# Patient Record
Sex: MALE | Race: WHITE | NOT HISPANIC OR LATINO | URBAN - METROPOLITAN AREA
[De-identification: names, ages, dates, MRNs, and addresses within clinical notes are randomized per-mention and may not be internally consistent; named-entity substitution may affect disease eponyms.]

---

## 2017-09-19 ENCOUNTER — OUTPATIENT (OUTPATIENT)
Dept: OUTPATIENT SERVICES | Facility: HOSPITAL | Age: 82
LOS: 1 days | End: 2017-09-19
Payer: MEDICARE

## 2017-09-19 PROCEDURE — 74176 CT ABD & PELVIS W/O CONTRAST: CPT

## 2017-09-19 PROCEDURE — 74176 CT ABD & PELVIS W/O CONTRAST: CPT | Mod: 26

## 2017-10-10 ENCOUNTER — APPOINTMENT (OUTPATIENT)
Dept: SURGERY | Facility: CLINIC | Age: 82
End: 2017-10-10
Payer: MEDICARE

## 2017-10-10 VITALS
HEART RATE: 83 BPM | OXYGEN SATURATION: 97 % | WEIGHT: 195 LBS | SYSTOLIC BLOOD PRESSURE: 152 MMHG | BODY MASS INDEX: 25.03 KG/M2 | HEIGHT: 74 IN | RESPIRATION RATE: 16 BRPM | DIASTOLIC BLOOD PRESSURE: 84 MMHG | TEMPERATURE: 97.9 F

## 2017-10-10 DIAGNOSIS — Z78.9 OTHER SPECIFIED HEALTH STATUS: ICD-10-CM

## 2017-10-10 DIAGNOSIS — Z82.49 FAMILY HISTORY OF ISCHEMIC HEART DISEASE AND OTHER DISEASES OF THE CIRCULATORY SYSTEM: ICD-10-CM

## 2017-10-10 DIAGNOSIS — M10.9 GOUT, UNSPECIFIED: ICD-10-CM

## 2017-10-10 DIAGNOSIS — I48.91 UNSPECIFIED ATRIAL FIBRILLATION: ICD-10-CM

## 2017-10-10 DIAGNOSIS — I10 ESSENTIAL (PRIMARY) HYPERTENSION: ICD-10-CM

## 2017-10-10 PROCEDURE — 99204 OFFICE O/P NEW MOD 45 MIN: CPT

## 2017-10-10 RX ORDER — METOPROLOL TARTRATE 25 MG/1
25 TABLET, FILM COATED ORAL
Refills: 0 | Status: ACTIVE | COMMUNITY

## 2017-10-10 RX ORDER — ALLOPURINOL 200 MG/1
TABLET ORAL
Refills: 0 | Status: ACTIVE | COMMUNITY

## 2017-10-10 RX ORDER — DABIGATRAN ETEXILATE MESYLATE 75 MG/1
75 CAPSULE ORAL
Refills: 0 | Status: ACTIVE | COMMUNITY

## 2017-10-10 RX ORDER — DILTIAZEM HYDROCHLORIDE 120 MG/1
120 TABLET ORAL
Refills: 0 | Status: ACTIVE | COMMUNITY

## 2017-11-15 ENCOUNTER — OTHER (OUTPATIENT)
Age: 82
End: 2017-11-15

## 2017-12-14 ENCOUNTER — APPOINTMENT (OUTPATIENT)
Dept: SURGERY | Facility: AMBULATORY MEDICAL SERVICES | Age: 82
End: 2017-12-14
Payer: MEDICARE

## 2017-12-14 PROCEDURE — 44970 LAPAROSCOPY APPENDECTOMY: CPT

## 2017-12-14 PROCEDURE — 49585: CPT

## 2017-12-26 ENCOUNTER — APPOINTMENT (OUTPATIENT)
Dept: SURGERY | Facility: CLINIC | Age: 82
End: 2017-12-26
Payer: MEDICARE

## 2017-12-26 VITALS
TEMPERATURE: 98 F | DIASTOLIC BLOOD PRESSURE: 80 MMHG | HEART RATE: 84 BPM | RESPIRATION RATE: 16 BRPM | OXYGEN SATURATION: 97 % | SYSTOLIC BLOOD PRESSURE: 124 MMHG

## 2017-12-26 DIAGNOSIS — Z09 ENCOUNTER FOR FOLLOW-UP EXAMINATION AFTER COMPLETED TREATMENT FOR CONDITIONS OTHER THAN MALIGNANT NEOPLASM: ICD-10-CM

## 2017-12-26 DIAGNOSIS — K37 UNSPECIFIED APPENDICITIS: ICD-10-CM

## 2017-12-26 DIAGNOSIS — K42.9 UMBILICAL HERNIA W/OUT OBSTRUCTION OR GANGRENE: ICD-10-CM

## 2017-12-26 PROCEDURE — 99024 POSTOP FOLLOW-UP VISIT: CPT

## 2018-09-12 ENCOUNTER — APPOINTMENT (OUTPATIENT)
Dept: ORTHOPEDIC SURGERY | Facility: CLINIC | Age: 83
End: 2018-09-12
Payer: MEDICARE

## 2018-09-12 VITALS
HEIGHT: 74 IN | SYSTOLIC BLOOD PRESSURE: 138 MMHG | BODY MASS INDEX: 25.28 KG/M2 | DIASTOLIC BLOOD PRESSURE: 90 MMHG | WEIGHT: 197 LBS | HEART RATE: 61 BPM

## 2018-09-12 DIAGNOSIS — M16.11 UNILATERAL PRIMARY OSTEOARTHRITIS, RIGHT HIP: ICD-10-CM

## 2018-09-12 PROCEDURE — 73502 X-RAY EXAM HIP UNI 2-3 VIEWS: CPT | Mod: RT

## 2018-09-12 PROCEDURE — 99204 OFFICE O/P NEW MOD 45 MIN: CPT

## 2018-10-10 ENCOUNTER — APPOINTMENT (OUTPATIENT)
Dept: ORTHOPEDIC SURGERY | Facility: CLINIC | Age: 83
End: 2018-10-10
Payer: MEDICARE

## 2018-10-10 DIAGNOSIS — M16.11 UNILATERAL PRIMARY OSTEOARTHRITIS, RIGHT HIP: ICD-10-CM

## 2018-10-10 PROCEDURE — 99212 OFFICE O/P EST SF 10 MIN: CPT

## 2018-10-16 ENCOUNTER — OUTPATIENT (OUTPATIENT)
Dept: OUTPATIENT SERVICES | Facility: HOSPITAL | Age: 83
LOS: 1 days | End: 2018-10-16
Payer: MEDICARE

## 2018-10-16 VITALS
HEIGHT: 73 IN | OXYGEN SATURATION: 97 % | RESPIRATION RATE: 16 BRPM | WEIGHT: 195.11 LBS | HEART RATE: 77 BPM | SYSTOLIC BLOOD PRESSURE: 132 MMHG | DIASTOLIC BLOOD PRESSURE: 62 MMHG | TEMPERATURE: 98 F

## 2018-10-16 DIAGNOSIS — M16.11 UNILATERAL PRIMARY OSTEOARTHRITIS, RIGHT HIP: ICD-10-CM

## 2018-10-16 DIAGNOSIS — Z90.49 ACQUIRED ABSENCE OF OTHER SPECIFIED PARTS OF DIGESTIVE TRACT: Chronic | ICD-10-CM

## 2018-10-16 DIAGNOSIS — Z01.818 ENCOUNTER FOR OTHER PREPROCEDURAL EXAMINATION: ICD-10-CM

## 2018-10-16 LAB
ANION GAP SERPL CALC-SCNC: 7 MMOL/L — SIGNIFICANT CHANGE UP (ref 5–17)
APTT BLD: 54.5 SEC — HIGH (ref 27.5–37.4)
BLD GP AB SCN SERPL QL: SIGNIFICANT CHANGE UP
BUN SERPL-MCNC: 20 MG/DL — SIGNIFICANT CHANGE UP (ref 7–23)
CALCIUM SERPL-MCNC: 9.1 MG/DL — SIGNIFICANT CHANGE UP (ref 8.4–10.5)
CHLORIDE SERPL-SCNC: 102 MMOL/L — SIGNIFICANT CHANGE UP (ref 96–108)
CO2 SERPL-SCNC: 28 MMOL/L — SIGNIFICANT CHANGE UP (ref 22–31)
CREAT SERPL-MCNC: 1.18 MG/DL — SIGNIFICANT CHANGE UP (ref 0.5–1.3)
GLUCOSE SERPL-MCNC: 139 MG/DL — HIGH (ref 70–99)
HCT VFR BLD CALC: 36.5 % — LOW (ref 39–50)
HGB BLD-MCNC: 12.2 G/DL — LOW (ref 13–17)
INR BLD: 1.4 RATIO — HIGH (ref 0.88–1.16)
MCHC RBC-ENTMCNC: 32.7 PG — SIGNIFICANT CHANGE UP (ref 27–34)
MCHC RBC-ENTMCNC: 33.4 GM/DL — SIGNIFICANT CHANGE UP (ref 32–36)
MCV RBC AUTO: 97.9 FL — SIGNIFICANT CHANGE UP (ref 80–100)
NRBC # BLD: 0 /100 WBCS — SIGNIFICANT CHANGE UP (ref 0–0)
PLATELET # BLD AUTO: 226 K/UL — SIGNIFICANT CHANGE UP (ref 150–400)
POTASSIUM SERPL-MCNC: 4.4 MMOL/L — SIGNIFICANT CHANGE UP (ref 3.5–5.3)
POTASSIUM SERPL-SCNC: 4.4 MMOL/L — SIGNIFICANT CHANGE UP (ref 3.5–5.3)
PROTHROM AB SERPL-ACNC: 15.4 SEC — HIGH (ref 9.8–12.7)
RBC # BLD: 3.73 M/UL — LOW (ref 4.2–5.8)
RBC # FLD: 12.2 % — SIGNIFICANT CHANGE UP (ref 10.3–14.5)
SODIUM SERPL-SCNC: 137 MMOL/L — SIGNIFICANT CHANGE UP (ref 135–145)
WBC # BLD: 7.75 K/UL — SIGNIFICANT CHANGE UP (ref 3.8–10.5)
WBC # FLD AUTO: 7.75 K/UL — SIGNIFICANT CHANGE UP (ref 3.8–10.5)

## 2018-10-16 PROCEDURE — 93010 ELECTROCARDIOGRAM REPORT: CPT

## 2018-10-16 RX ORDER — GABAPENTIN 400 MG/1
0 CAPSULE ORAL
Qty: 0 | Refills: 0 | COMMUNITY

## 2018-10-16 RX ORDER — ALLOPURINOL 300 MG
1 TABLET ORAL
Qty: 0 | Refills: 0 | COMMUNITY

## 2018-10-16 RX ORDER — DILTIAZEM HCL 120 MG
1 CAPSULE, EXT RELEASE 24 HR ORAL
Qty: 0 | Refills: 0 | COMMUNITY

## 2018-10-16 RX ORDER — METOPROLOL TARTRATE 50 MG
1 TABLET ORAL
Qty: 0 | Refills: 0 | COMMUNITY

## 2018-10-16 NOTE — H&P PST ADULT - NEGATIVE ENMT SYMPTOMS
no nose bleeds/no recurrent cold sores/no ear pain/no vertigo/no sinus symptoms/no nasal congestion/no throat pain/no dysphagia/no hearing difficulty/no nasal discharge/no tinnitus/no nasal obstruction/no post-nasal discharge/no abnormal taste sensation/no gum bleeding/no dry mouth

## 2018-10-16 NOTE — H&P PST ADULT - MUSCULOSKELETAL
details… detailed exam no joint swelling/no joint erythema/no calf tenderness/decreased ROM due to pain/no joint warmth

## 2018-10-16 NOTE — H&P PST ADULT - PROBLEM SELECTOR PLAN 1
"RIGHT anterior total HIP replacement" on 10/25/18   Diagnostics ordered  Questions answered  Instructions reviewed and signed  Contact info given  Best wishes offered

## 2018-10-16 NOTE — H&P PST ADULT - VENOUS THROMBOEMBOLISM CURRENT STATUS
(1) other risk factor (includes escalating BMI, pack-years of smoking, diabetes requiring insulin, chemotherapy, female gender and length of surgery)

## 2018-10-16 NOTE — H&P PST ADULT - NSANTHOSAYNRD_GEN_A_CORE
No. AVINASH screening performed.  STOP BANG Legend: 0-2 = LOW Risk; 3-4 = INTERMEDIATE Risk; 5-8 = HIGH Risk

## 2018-10-16 NOTE — H&P PST ADULT - HISTORY OF PRESENT ILLNESS
86 to male presents to [facility] for scheduled RIGHT anterior total hip replacement on 10/25/18 with Billy Hooker MD.  Reports right hip pain since 8/2018 with radiatio to groin for which he has tried physical therapy without significant relief.  Pain worst with walking and bending which rates 8/10.  Using cane for stability.

## 2018-10-16 NOTE — H&P PST ADULT - PMH
Atrial fibrillation    Chronic back pain    Gout    Hypertension    Neuropathy  bilateral feet  Osteoarthritis of right hip

## 2018-10-17 ENCOUNTER — OUTPATIENT (OUTPATIENT)
Dept: OUTPATIENT SERVICES | Facility: HOSPITAL | Age: 83
LOS: 1 days | Discharge: HOME | End: 2018-10-17

## 2018-10-17 ENCOUNTER — OUTPATIENT (OUTPATIENT)
Dept: OUTPATIENT SERVICES | Facility: HOSPITAL | Age: 83
LOS: 1 days | Discharge: HOME | End: 2018-10-17
Payer: MEDICARE

## 2018-10-17 DIAGNOSIS — Z90.49 ACQUIRED ABSENCE OF OTHER SPECIFIED PARTS OF DIGESTIVE TRACT: Chronic | ICD-10-CM

## 2018-10-17 LAB
MRSA PCR RESULT.: SIGNIFICANT CHANGE UP
S AUREUS DNA NOSE QL NAA+PROBE: SIGNIFICANT CHANGE UP

## 2018-10-18 ENCOUNTER — APPOINTMENT (OUTPATIENT)
Dept: RADIATION ONCOLOGY | Facility: CLINIC | Age: 83
End: 2018-10-18
Payer: MEDICARE

## 2018-10-18 VITALS
WEIGHT: 195 LBS | SYSTOLIC BLOOD PRESSURE: 120 MMHG | RESPIRATION RATE: 16 BRPM | DIASTOLIC BLOOD PRESSURE: 70 MMHG | HEIGHT: 73.5 IN | BODY MASS INDEX: 25.29 KG/M2 | HEART RATE: 62 BPM

## 2018-10-18 PROCEDURE — 99203 OFFICE O/P NEW LOW 30 MIN: CPT | Mod: 25

## 2018-10-18 PROCEDURE — 77261 THER RADIOLOGY TX PLNG SMPL: CPT

## 2018-10-18 RX ORDER — TRANEXAMIC ACID 100 MG/ML
1 INJECTION, SOLUTION INTRAVENOUS ONCE
Qty: 0 | Refills: 0 | Status: COMPLETED | OUTPATIENT
Start: 2018-10-25 | End: 2018-10-25

## 2018-10-18 RX ORDER — SODIUM CHLORIDE 9 MG/ML
1000 INJECTION, SOLUTION INTRAVENOUS
Qty: 0 | Refills: 0 | Status: DISCONTINUED | OUTPATIENT
Start: 2018-10-25 | End: 2018-10-25

## 2018-10-18 RX ORDER — ACETAMINOPHEN 500 MG
1000 TABLET ORAL ONCE
Qty: 0 | Refills: 0 | Status: COMPLETED | OUTPATIENT
Start: 2018-10-25 | End: 2018-10-25

## 2018-10-18 RX ORDER — CHLORHEXIDINE GLUCONATE 213 G/1000ML
1 SOLUTION TOPICAL ONCE
Qty: 0 | Refills: 0 | Status: COMPLETED | OUTPATIENT
Start: 2018-10-25 | End: 2018-10-25

## 2018-10-18 RX ORDER — APREPITANT 80 MG/1
40 CAPSULE ORAL ONCE
Qty: 0 | Refills: 0 | Status: COMPLETED | OUTPATIENT
Start: 2018-10-25 | End: 2018-10-25

## 2018-10-18 RX ORDER — CEFAZOLIN SODIUM 1 G
2000 VIAL (EA) INJECTION ONCE
Qty: 0 | Refills: 0 | Status: COMPLETED | OUTPATIENT
Start: 2018-10-25 | End: 2018-10-25

## 2018-10-18 RX ORDER — OMEGA-3S/DHA/EPA/FISH OIL 300-1000MG
CAPSULE ORAL
Refills: 0 | Status: DISCONTINUED | COMMUNITY
End: 2018-10-18

## 2018-10-18 NOTE — HISTORY OF PRESENT ILLNESS
[FreeTextEntry1] : Pt has been having Right hip pain for the past 2-3 months, 9/10.  Pt does not comp[jane of pain when sitting 0/10, only when moving around.  Pt ambulates with a cane.  Pt does not take any pain medication only an occasional Tylenol for slight relief.  Pt is scheduled for a Right total hip replacement on 10/25/18 and will be having 1 preop RT treatment to Right hip on 10/24/18.  Radiation procedure was explained to pt with full understanding.

## 2018-10-18 NOTE — VITALS
[Maximal Pain Intensity: 9/10] : 9/10 [Least Pain Intensity: 0/10] : 0/10 [Pain Description/Quality: ___] : Pain description/quality: [unfilled] [Pain Duration: ___] : Pain duration: [unfilled] [Pain Location: ___] : Pain Location: [unfilled] [Pain Interferes with ADLs] : Pain interferes with activities of daily living. [OTC] : OTC [80: Normal activity with effort; some signs or symptoms of disease.] : 80: Normal activity with effort; some signs or symptoms of disease.  [ECOG Performance Status: 1 - Restricted in physically strenuous activity but ambulatory and able to carry out work of a light or sedentary nature] : Performance Status: 1 - Restricted in physically strenuous activity but ambulatory and able to carry out work of a light or sedentary nature, e.g., light house work, office work

## 2018-10-18 NOTE — HISTORY OF PRESENT ILLNESS
[FreeTextEntry1] : Mr Bobo is an 85 y/o male with a one month history of right hip pain, evaluated by Dr Hooker on 9/12/18. X-ray showed narrowing of the joint space. Mr Guadaulpe underwent physical therapy with no improvement, planned for right total hip replacement. In retrospect, patient has had several months' of discomfort, but postponed evaluation , as his wife recently had a pacemaker.

## 2018-10-18 NOTE — PHYSICAL EXAM
[Normal] : normal spine exam without palpable tenderness, no kyphosis or scoliosis [de-identified] : moderate limping/hesitancy when walking, no leg weakness, adequate ROM, does not use cane

## 2018-10-18 NOTE — REASON FOR VISIT
[Consideration of Therapy for Benign Disease] : consideration of therapy for benign disease [Other: ___] : [unfilled] [Spouse] : spouse

## 2018-10-19 PROBLEM — G62.9 POLYNEUROPATHY, UNSPECIFIED: Chronic | Status: ACTIVE | Noted: 2018-10-16

## 2018-10-19 PROBLEM — M16.11 UNILATERAL PRIMARY OSTEOARTHRITIS, RIGHT HIP: Chronic | Status: ACTIVE | Noted: 2018-10-16

## 2018-10-19 PROBLEM — I48.91 UNSPECIFIED ATRIAL FIBRILLATION: Chronic | Status: ACTIVE | Noted: 2018-10-16

## 2018-10-19 PROBLEM — I10 ESSENTIAL (PRIMARY) HYPERTENSION: Chronic | Status: ACTIVE | Noted: 2018-10-16

## 2018-10-19 PROBLEM — M54.9 DORSALGIA, UNSPECIFIED: Chronic | Status: ACTIVE | Noted: 2018-10-16

## 2018-10-19 PROBLEM — M10.9 GOUT, UNSPECIFIED: Chronic | Status: ACTIVE | Noted: 2018-10-16

## 2018-10-24 PROCEDURE — 77431 RADIATION THERAPY MANAGEMENT: CPT

## 2018-10-24 PROCEDURE — 77280 THER RAD SIMULAJ FIELD SMPL: CPT | Mod: 26

## 2018-10-24 PROCEDURE — 77300 RADIATION THERAPY DOSE PLAN: CPT | Mod: 26

## 2018-10-24 RX ORDER — SENNA PLUS 8.6 MG/1
2 TABLET ORAL AT BEDTIME
Qty: 0 | Refills: 0 | Status: DISCONTINUED | OUTPATIENT
Start: 2018-10-25 | End: 2018-10-28

## 2018-10-24 RX ORDER — DOCUSATE SODIUM 100 MG
100 CAPSULE ORAL THREE TIMES A DAY
Qty: 0 | Refills: 0 | Status: DISCONTINUED | OUTPATIENT
Start: 2018-10-25 | End: 2018-10-28

## 2018-10-24 RX ORDER — POLYETHYLENE GLYCOL 3350 17 G/17G
17 POWDER, FOR SOLUTION ORAL DAILY
Qty: 0 | Refills: 0 | Status: DISCONTINUED | OUTPATIENT
Start: 2018-10-25 | End: 2018-10-28

## 2018-10-24 RX ORDER — PANTOPRAZOLE SODIUM 20 MG/1
40 TABLET, DELAYED RELEASE ORAL DAILY
Qty: 0 | Refills: 0 | Status: DISCONTINUED | OUTPATIENT
Start: 2018-10-25 | End: 2018-10-28

## 2018-10-24 RX ORDER — SODIUM CHLORIDE 9 MG/ML
1000 INJECTION, SOLUTION INTRAVENOUS
Qty: 0 | Refills: 0 | Status: DISCONTINUED | OUTPATIENT
Start: 2018-10-25 | End: 2018-10-28

## 2018-10-24 RX ORDER — MAGNESIUM HYDROXIDE 400 MG/1
30 TABLET, CHEWABLE ORAL DAILY
Qty: 0 | Refills: 0 | Status: DISCONTINUED | OUTPATIENT
Start: 2018-10-25 | End: 2018-10-28

## 2018-10-24 RX ORDER — ONDANSETRON 8 MG/1
4 TABLET, FILM COATED ORAL EVERY 6 HOURS
Qty: 0 | Refills: 0 | Status: DISCONTINUED | OUTPATIENT
Start: 2018-10-25 | End: 2018-10-28

## 2018-10-25 ENCOUNTER — TRANSCRIPTION ENCOUNTER (OUTPATIENT)
Age: 83
End: 2018-10-25

## 2018-10-25 ENCOUNTER — APPOINTMENT (OUTPATIENT)
Dept: ORTHOPEDIC SURGERY | Facility: HOSPITAL | Age: 83
End: 2018-10-25

## 2018-10-25 ENCOUNTER — INPATIENT (INPATIENT)
Facility: HOSPITAL | Age: 83
LOS: 2 days | Discharge: HOME | DRG: 470 | End: 2018-10-28
Attending: ORTHOPAEDIC SURGERY | Admitting: ORTHOPAEDIC SURGERY
Payer: MEDICARE

## 2018-10-25 ENCOUNTER — RESULT REVIEW (OUTPATIENT)
Age: 83
End: 2018-10-25

## 2018-10-25 VITALS
HEART RATE: 97 BPM | HEIGHT: 74 IN | RESPIRATION RATE: 9 BRPM | DIASTOLIC BLOOD PRESSURE: 70 MMHG | WEIGHT: 194.01 LBS | SYSTOLIC BLOOD PRESSURE: 135 MMHG | TEMPERATURE: 98 F

## 2018-10-25 DIAGNOSIS — M16.11 UNILATERAL PRIMARY OSTEOARTHRITIS, RIGHT HIP: ICD-10-CM

## 2018-10-25 DIAGNOSIS — Z90.49 ACQUIRED ABSENCE OF OTHER SPECIFIED PARTS OF DIGESTIVE TRACT: Chronic | ICD-10-CM

## 2018-10-25 LAB
ABO RH CONFIRMATION: SIGNIFICANT CHANGE UP
ANION GAP SERPL CALC-SCNC: 5 MMOL/L — SIGNIFICANT CHANGE UP (ref 5–17)
APTT BLD: 30.1 SEC — SIGNIFICANT CHANGE UP (ref 27.5–37.4)
BUN SERPL-MCNC: 19 MG/DL — SIGNIFICANT CHANGE UP (ref 7–23)
CALCIUM SERPL-MCNC: 8.7 MG/DL — SIGNIFICANT CHANGE UP (ref 8.4–10.5)
CHLORIDE SERPL-SCNC: 103 MMOL/L — SIGNIFICANT CHANGE UP (ref 96–108)
CO2 SERPL-SCNC: 28 MMOL/L — SIGNIFICANT CHANGE UP (ref 22–31)
CREAT SERPL-MCNC: 1.17 MG/DL — SIGNIFICANT CHANGE UP (ref 0.5–1.3)
GLUCOSE SERPL-MCNC: 134 MG/DL — HIGH (ref 70–99)
HCT VFR BLD CALC: 28.7 % — LOW (ref 39–50)
HGB BLD-MCNC: 9.5 G/DL — LOW (ref 13–17)
INR BLD: 1.26 RATIO — HIGH (ref 0.88–1.16)
INR BLD: 1.26 RATIO — HIGH (ref 0.88–1.16)
MCHC RBC-ENTMCNC: 32.4 PG — SIGNIFICANT CHANGE UP (ref 27–34)
MCHC RBC-ENTMCNC: 33.1 GM/DL — SIGNIFICANT CHANGE UP (ref 32–36)
MCV RBC AUTO: 98 FL — SIGNIFICANT CHANGE UP (ref 80–100)
NRBC # BLD: 0 /100 WBCS — SIGNIFICANT CHANGE UP (ref 0–0)
PLATELET # BLD AUTO: 175 K/UL — SIGNIFICANT CHANGE UP (ref 150–400)
POTASSIUM SERPL-MCNC: 4.3 MMOL/L — SIGNIFICANT CHANGE UP (ref 3.5–5.3)
POTASSIUM SERPL-SCNC: 4.3 MMOL/L — SIGNIFICANT CHANGE UP (ref 3.5–5.3)
PROTHROM AB SERPL-ACNC: 13.8 SEC — HIGH (ref 9.8–12.7)
PROTHROM AB SERPL-ACNC: 13.8 SEC — HIGH (ref 9.8–12.7)
RBC # BLD: 2.93 M/UL — LOW (ref 4.2–5.8)
RBC # FLD: 12.2 % — SIGNIFICANT CHANGE UP (ref 10.3–14.5)
SODIUM SERPL-SCNC: 136 MMOL/L — SIGNIFICANT CHANGE UP (ref 135–145)
WBC # BLD: 9.92 K/UL — SIGNIFICANT CHANGE UP (ref 3.8–10.5)
WBC # FLD AUTO: 9.92 K/UL — SIGNIFICANT CHANGE UP (ref 3.8–10.5)

## 2018-10-25 PROCEDURE — G0463: CPT

## 2018-10-25 PROCEDURE — 85027 COMPLETE CBC AUTOMATED: CPT

## 2018-10-25 PROCEDURE — 87640 STAPH A DNA AMP PROBE: CPT

## 2018-10-25 PROCEDURE — 86900 BLOOD TYPING SEROLOGIC ABO: CPT

## 2018-10-25 PROCEDURE — 93005 ELECTROCARDIOGRAM TRACING: CPT

## 2018-10-25 PROCEDURE — 85730 THROMBOPLASTIN TIME PARTIAL: CPT

## 2018-10-25 PROCEDURE — 87641 MR-STAPH DNA AMP PROBE: CPT

## 2018-10-25 PROCEDURE — 86901 BLOOD TYPING SEROLOGIC RH(D): CPT

## 2018-10-25 PROCEDURE — 27130 TOTAL HIP ARTHROPLASTY: CPT | Mod: RT

## 2018-10-25 PROCEDURE — 36415 COLL VENOUS BLD VENIPUNCTURE: CPT

## 2018-10-25 PROCEDURE — 27130 TOTAL HIP ARTHROPLASTY: CPT | Mod: AS,RT

## 2018-10-25 PROCEDURE — 88305 TISSUE EXAM BY PATHOLOGIST: CPT | Mod: 26

## 2018-10-25 PROCEDURE — 88311 DECALCIFY TISSUE: CPT | Mod: 26

## 2018-10-25 PROCEDURE — 85610 PROTHROMBIN TIME: CPT

## 2018-10-25 PROCEDURE — 86850 RBC ANTIBODY SCREEN: CPT

## 2018-10-25 PROCEDURE — 80048 BASIC METABOLIC PNL TOTAL CA: CPT

## 2018-10-25 PROCEDURE — 99223 1ST HOSP IP/OBS HIGH 75: CPT

## 2018-10-25 RX ORDER — CEFAZOLIN SODIUM 1 G
2000 VIAL (EA) INJECTION EVERY 8 HOURS
Qty: 0 | Refills: 0 | Status: COMPLETED | OUTPATIENT
Start: 2018-10-25 | End: 2018-10-25

## 2018-10-25 RX ORDER — SENNA PLUS 8.6 MG/1
2 TABLET ORAL
Qty: 0 | Refills: 0 | COMMUNITY
Start: 2018-10-25

## 2018-10-25 RX ORDER — METOPROLOL TARTRATE 50 MG
25 TABLET ORAL DAILY
Qty: 0 | Refills: 0 | Status: DISCONTINUED | OUTPATIENT
Start: 2018-10-25 | End: 2018-10-26

## 2018-10-25 RX ORDER — ACETAMINOPHEN 500 MG
2 TABLET ORAL
Qty: 0 | Refills: 0 | COMMUNITY
Start: 2018-10-25

## 2018-10-25 RX ORDER — DOCUSATE SODIUM 100 MG
1 CAPSULE ORAL
Qty: 0 | Refills: 0 | COMMUNITY
Start: 2018-10-25

## 2018-10-25 RX ORDER — CELECOXIB 200 MG/1
200 CAPSULE ORAL EVERY 12 HOURS
Qty: 0 | Refills: 0 | Status: DISCONTINUED | OUTPATIENT
Start: 2018-10-25 | End: 2018-10-25

## 2018-10-25 RX ORDER — POLYETHYLENE GLYCOL 3350 17 G/17G
17 POWDER, FOR SOLUTION ORAL
Qty: 0 | Refills: 0 | COMMUNITY
Start: 2018-10-25

## 2018-10-25 RX ORDER — HYDROMORPHONE HYDROCHLORIDE 2 MG/ML
0.5 INJECTION INTRAMUSCULAR; INTRAVENOUS; SUBCUTANEOUS
Qty: 0 | Refills: 0 | Status: DISCONTINUED | OUTPATIENT
Start: 2018-10-25 | End: 2018-10-25

## 2018-10-25 RX ORDER — ALLOPURINOL 300 MG
300 TABLET ORAL DAILY
Qty: 0 | Refills: 0 | Status: DISCONTINUED | OUTPATIENT
Start: 2018-10-25 | End: 2018-10-28

## 2018-10-25 RX ORDER — ACETAMINOPHEN 500 MG
1000 TABLET ORAL EVERY 8 HOURS
Qty: 0 | Refills: 0 | Status: DISCONTINUED | OUTPATIENT
Start: 2018-10-26 | End: 2018-10-28

## 2018-10-25 RX ORDER — DILTIAZEM HCL 120 MG
240 CAPSULE, EXT RELEASE 24 HR ORAL DAILY
Qty: 0 | Refills: 0 | Status: DISCONTINUED | OUTPATIENT
Start: 2018-10-26 | End: 2018-10-28

## 2018-10-25 RX ORDER — ACETAMINOPHEN 500 MG
1000 TABLET ORAL EVERY 6 HOURS
Qty: 0 | Refills: 0 | Status: COMPLETED | OUTPATIENT
Start: 2018-10-25 | End: 2018-10-26

## 2018-10-25 RX ORDER — APIXABAN 2.5 MG/1
2.5 TABLET, FILM COATED ORAL EVERY 12 HOURS
Qty: 0 | Refills: 0 | Status: COMPLETED | OUTPATIENT
Start: 2018-10-26 | End: 2018-10-26

## 2018-10-25 RX ORDER — SODIUM CHLORIDE 9 MG/ML
1000 INJECTION, SOLUTION INTRAVENOUS
Qty: 0 | Refills: 0 | Status: DISCONTINUED | OUTPATIENT
Start: 2018-10-25 | End: 2018-10-25

## 2018-10-25 RX ORDER — OXYCODONE HYDROCHLORIDE 5 MG/1
10 TABLET ORAL
Qty: 0 | Refills: 0 | Status: DISCONTINUED | OUTPATIENT
Start: 2018-10-25 | End: 2018-10-28

## 2018-10-25 RX ORDER — GABAPENTIN 400 MG/1
100 CAPSULE ORAL
Qty: 0 | Refills: 0 | Status: DISCONTINUED | OUTPATIENT
Start: 2018-10-25 | End: 2018-10-28

## 2018-10-25 RX ORDER — HYDROMORPHONE HYDROCHLORIDE 2 MG/ML
0.5 INJECTION INTRAMUSCULAR; INTRAVENOUS; SUBCUTANEOUS EVERY 4 HOURS
Qty: 0 | Refills: 0 | Status: DISCONTINUED | OUTPATIENT
Start: 2018-10-25 | End: 2018-10-28

## 2018-10-25 RX ORDER — DABIGATRAN ETEXILATE MESYLATE 150 MG/1
1 CAPSULE ORAL
Qty: 0 | Refills: 0 | COMMUNITY

## 2018-10-25 RX ORDER — OXYCODONE HYDROCHLORIDE 5 MG/1
5 TABLET ORAL
Qty: 0 | Refills: 0 | Status: DISCONTINUED | OUTPATIENT
Start: 2018-10-25 | End: 2018-10-28

## 2018-10-25 RX ORDER — APIXABAN 2.5 MG/1
5 TABLET, FILM COATED ORAL EVERY 12 HOURS
Qty: 0 | Refills: 0 | Status: DISCONTINUED | OUTPATIENT
Start: 2018-10-27 | End: 2018-10-28

## 2018-10-25 RX ADMIN — SENNA PLUS 2 TABLET(S): 8.6 TABLET ORAL at 21:08

## 2018-10-25 RX ADMIN — Medication 400 MILLIGRAM(S): at 21:08

## 2018-10-25 RX ADMIN — Medication 400 MILLIGRAM(S): at 13:56

## 2018-10-25 RX ADMIN — SODIUM CHLORIDE 125 MILLILITER(S): 9 INJECTION, SOLUTION INTRAVENOUS at 16:18

## 2018-10-25 RX ADMIN — HYDROMORPHONE HYDROCHLORIDE 0.5 MILLIGRAM(S): 2 INJECTION INTRAMUSCULAR; INTRAVENOUS; SUBCUTANEOUS at 13:13

## 2018-10-25 RX ADMIN — HYDROMORPHONE HYDROCHLORIDE 0.5 MILLIGRAM(S): 2 INJECTION INTRAMUSCULAR; INTRAVENOUS; SUBCUTANEOUS at 12:10

## 2018-10-25 RX ADMIN — HYDROMORPHONE HYDROCHLORIDE 0.5 MILLIGRAM(S): 2 INJECTION INTRAMUSCULAR; INTRAVENOUS; SUBCUTANEOUS at 12:25

## 2018-10-25 RX ADMIN — Medication 100 MILLIGRAM(S): at 23:29

## 2018-10-25 RX ADMIN — Medication 1000 MILLIGRAM(S): at 21:08

## 2018-10-25 RX ADMIN — Medication 1000 MILLIGRAM(S): at 14:30

## 2018-10-25 RX ADMIN — Medication 100 MILLIGRAM(S): at 21:08

## 2018-10-25 RX ADMIN — APREPITANT 40 MILLIGRAM(S): 80 CAPSULE ORAL at 07:15

## 2018-10-25 RX ADMIN — Medication 100 MILLIGRAM(S): at 16:18

## 2018-10-25 RX ADMIN — OXYCODONE HYDROCHLORIDE 10 MILLIGRAM(S): 5 TABLET ORAL at 21:08

## 2018-10-25 RX ADMIN — OXYCODONE HYDROCHLORIDE 10 MILLIGRAM(S): 5 TABLET ORAL at 21:38

## 2018-10-25 RX ADMIN — SODIUM CHLORIDE 75 MILLILITER(S): 9 INJECTION, SOLUTION INTRAVENOUS at 07:00

## 2018-10-25 RX ADMIN — CHLORHEXIDINE GLUCONATE 1 APPLICATION(S): 213 SOLUTION TOPICAL at 07:00

## 2018-10-25 RX ADMIN — SODIUM CHLORIDE 50 MILLILITER(S): 9 INJECTION, SOLUTION INTRAVENOUS at 12:25

## 2018-10-25 RX ADMIN — GABAPENTIN 100 MILLIGRAM(S): 400 CAPSULE ORAL at 17:56

## 2018-10-25 RX ADMIN — HYDROMORPHONE HYDROCHLORIDE 0.5 MILLIGRAM(S): 2 INJECTION INTRAMUSCULAR; INTRAVENOUS; SUBCUTANEOUS at 13:00

## 2018-10-25 NOTE — CONSULT NOTE ADULT - SUBJECTIVE AND OBJECTIVE BOX
Information Obtained from:  EHR, Physical Chart, Patient at bedside (relevant EHR and Chart information verified with patient)    CC : Patient is a 86y old  Male who presents with a chief complaint of right hip pain    HPI:  85yo M admitted s/p RIGHT THR today.  Has had right hip pain for the last 3 months, dull, throbbing, radiation to groin, tried PT with partial relief, 0-2/10 at rest, up to 8/10 with activity, pain affecting quality of life.       REVIEW OF SYSTEMS:  CONSTITUTIONAL: No fever, weight loss, or fatigue  EYES: No eye pain, visual disturbances, or discharge  ENMT:  No difficulty hearing, tinnitus, vertigo; No sinus or throat pain  NECK: No pain or stiffness  BREASTS: No pain, masses, or nipple discharge  RESPIRATORY: No cough, wheezing, chills or hemoptysis; No shortness of breath  CARDIOVASCULAR: No chest pain, palpitations, dizziness, or leg swelling  GASTROINTESTINAL: No abdominal or epigastric pain. No nausea, vomiting, or hematemesis; No diarrhea or constipation. No melena or hematochezia.  GENITOURINARY: No dysuria, frequency, hematuria, or incontinence  NEUROLOGICAL: No headaches, memory loss, loss of strength, numbness, or tremors  SKIN: No itching, burning, rashes, or lesions   LYMPH NODES: No enlarged glands  ENDOCRINE: No heat or cold intolerance; No hair loss  MUSCULOSKELETAL: No muscle or back pain  PSYCHIATRIC: No depression, anxiety, mood swings, or difficulty sleeping  HEME/LYMPH: No easy bruising, or bleeding gums  ALLERGY AND IMMUNOLOGIC: No hives or eczema    PAST MEDICAL & SURGICAL HISTORY:  Chronic back pain  Neuropathy: bilateral feet  Osteoarthritis of right hip  Hypertension  Atrial fibrillation  Gout  History of appendectomy: 2017    SOCIAL HISTORY:  Lives: with spouse  Smoking Hx: former smoker, quit 50ya  ETOH Hx: 1-2 drinks/2-3x week on average  Illicit Drug Use:  None    Allergies    No Known Allergies    Intolerances    Home Medications:  allopurinol 300 mg oral tablet: 1 tab(s) orally once a day (16 Oct 2018 11:10)  dilTIAZem 240 mg/24 hours oral tablet, extended release: 1 tab(s) orally once a day (16 Oct 2018 11:10)  gabapentin 100 mg oral capsule: orally 2 times a day (16 Oct 2018 11:10)  metoprolol succinate 25 mg oral tablet, extended release: 1 tab(s) orally once a day (16 Oct 2018 11:10)  Pradaxa 150 mg oral capsule: 1 cap(s) orally 2 times a day (16 Oct 2018 11:10)    FAMILY HISTORY:  No pertinent family history in first degree relatives    PHYSICAL EXAM:  Vital Signs Last 24 Hrs  T(C): 36.6 (25 Oct 2018 07:12), Max: 36.6 (25 Oct 2018 07:12)  T(F): 97.8 (25 Oct 2018 07:12), Max: 97.8 (25 Oct 2018 07:12)  HR: 97 (25 Oct 2018 07:12) (97 - 97)  BP: 135/70 (25 Oct 2018 07:12) (135/70 - 135/70)  BP(mean): --  RR: 9 (25 Oct 2018 07:12) (9 - 9)  SpO2: --    GENERAL: NAD, well-groomed, well-developed, awake, alert, oriented x 3, fluent and coherent speech  HEAD:  Atraumatic, Normocephalic  EYES: EOMI, PERRLA, conjunctiva and sclera clear  ENMT: No tonsillar erythema, exudates, or enlargement; Moist mucous membranes, Good dentition, No lesions  NECK: Supple, No JVD, No Cervical LAD, No thyromegaly, No thyroid nodules  NERVOUS SYSTEM:  Good concentration; No facial droop  CHEST WALL: No masses  CHEST/LUNG: Clear to auscultation bilaterally; No rales, rhonchi, wheezing, or rubs  HEART: Regular rate and rhythm; No murmurs, rubs, or gallops  ABDOMEN: Soft, Nontender, Nondistended, Bowel sounds present, No palpable masses or organomegaly, No bruits  EXTREMITIES:  2+ Peripheral Pulses, No clubbing, cyanosis, or edema  INCISION:  Dressing clean/dry/intact    LABS:    PT/INR - ( 25 Oct 2018 06:55 )   PT: 13.8 sec;   INR: 1.26 ratio         PTT - ( 25 Oct 2018 06:55 )  PTT:32.8 sec       EKG (was personally reviewed): yes, afib 85bpm Information Obtained from:  EHR, Physical Chart, Patient at bedside (relevant EHR and Chart information verified with patient)    CC : Patient is a 86y old  Male who presents with a chief complaint of right hip pain    HPI:  85yo M admitted s/p RIGHT THR today.  Has had right hip pain for the last 3 months, dull, throbbing, radiation to groin, tried PT with partial relief, 0-2/10 at rest, up to 8/10 with activity, pain affecting quality of life.     Restless in PACU, going to receive dilaudid now for pain.    REVIEW OF SYSTEMS:  CONSTITUTIONAL: No fever, weight loss, or fatigue  EYES: No eye pain, visual disturbances, or discharge  ENMT:  No difficulty hearing, tinnitus, vertigo; No sinus or throat pain  NECK: No pain or stiffness  BREASTS: No pain, masses, or nipple discharge  RESPIRATORY: No cough, wheezing, chills or hemoptysis; No shortness of breath  CARDIOVASCULAR: No chest pain, palpitations, dizziness, or leg swelling  GASTROINTESTINAL: No abdominal or epigastric pain. No nausea, vomiting, or hematemesis; No diarrhea or constipation. No melena or hematochezia.  GENITOURINARY: No dysuria, frequency, hematuria, or incontinence  NEUROLOGICAL: No headaches, memory loss, loss of strength, numbness, or tremors  SKIN: No itching, burning, rashes, or lesions   LYMPH NODES: No enlarged glands  ENDOCRINE: No heat or cold intolerance; No hair loss  MUSCULOSKELETAL: No muscle or back pain  PSYCHIATRIC: No depression, anxiety, mood swings, or difficulty sleeping  HEME/LYMPH: No easy bruising, or bleeding gums  ALLERGY AND IMMUNOLOGIC: No hives or eczema    PAST MEDICAL & SURGICAL HISTORY:  Chronic back pain  Neuropathy: bilateral feet  Osteoarthritis of right hip  Hypertension  Atrial fibrillation  Gout  History of appendectomy: 2017    SOCIAL HISTORY:  Lives: with spouse  Smoking Hx: former smoker, quit 50ya  ETOH Hx: 1-2 drinks/2-3x week on average  Illicit Drug Use:  None    Allergies    No Known Allergies    Intolerances    Home Medications:  allopurinol 300 mg oral tablet: 1 tab(s) orally once a day (16 Oct 2018 11:10)  dilTIAZem 240 mg/24 hours oral tablet, extended release: 1 tab(s) orally once a day (16 Oct 2018 11:10)  gabapentin 100 mg oral capsule: orally 2 times a day (16 Oct 2018 11:10)  metoprolol succinate 25 mg oral tablet, extended release: 1 tab(s) orally once a day (16 Oct 2018 11:10)  Pradaxa 150 mg oral capsule: 1 cap(s) orally 2 times a day (16 Oct 2018 11:10)    FAMILY HISTORY:  No pertinent family history in first degree relatives    PHYSICAL EXAM:  Vital Signs Last 24 Hrs  T(C): 36.6 (25 Oct 2018 07:12), Max: 36.6 (25 Oct 2018 07:12)  T(F): 97.8 (25 Oct 2018 07:12), Max: 97.8 (25 Oct 2018 07:12)  HR: 97 (25 Oct 2018 07:12) (97 - 97)  BP: 135/70 (25 Oct 2018 07:12) (135/70 - 135/70)  BP(mean): --  RR: 9 (25 Oct 2018 07:12) (9 - 9)  SpO2: --    GENERAL: NAD, well-groomed, well-developed, awake, alert, restless, appears to be in pain at surgical site  HEAD:  Atraumatic, Normocephalic  EYES: EOMI, PERRLA, conjunctiva and sclera clear  ENMT: No tonsillar erythema, exudates, or enlargement; Moist mucous membranes, Good dentition, No lesions  NECK: Supple, No JVD, No Cervical LAD, No thyromegaly, No thyroid nodules  NERVOUS SYSTEM:   No facial droop, moving all 4 extremities  CHEST WALL: No masses  CHEST/LUNG: Clear to auscultation bilaterally; No rales, rhonchi, wheezing, or rubs  HEART: Regular rate and rhythm; No murmurs, rubs, or gallops  ABDOMEN: Soft, Nontender, Nondistended, Bowel sounds present, No palpable masses or organomegaly, No bruits  EXTREMITIES:  2+ Peripheral Pulses, No clubbing, cyanosis, or edema  INCISION:  Dressing clean/dry/intact    LABS:    PT/INR - ( 25 Oct 2018 06:55 )   PT: 13.8 sec;   INR: 1.26 ratio         PTT - ( 25 Oct 2018 06:55 )  PTT:32.8 sec       EKG (was personally reviewed): yes, afib 85bpm

## 2018-10-25 NOTE — OCCUPATIONAL THERAPY INITIAL EVALUATION ADULT - PRECAUTIONS/LIMITATIONS, REHAB EVAL
surgical precautions/No hyperextension, no extreme external rotation, no extreme abduction of operated LE./fall precautions
surgical precautions/No hyperextension, no extreme external rotation, no extreme abduction of operated LE./fall precautions

## 2018-10-25 NOTE — OCCUPATIONAL THERAPY INITIAL EVALUATION ADULT - BED MOBILITY TRAINING, PT EVAL
Pt will perform bed mobility with supervision within 2-3 sessions.
Pt will perform bed mobility with supervision within 3-5 sessions.

## 2018-10-25 NOTE — DISCHARGE NOTE ADULT - MEDICATION SUMMARY - MEDICATIONS TO TAKE
I will START or STAY ON the medications listed below when I get home from the hospital:    Rolling Walker with 5inch wheels  -- Indication: For Medical Device    3:1 Commode  -- s/p Right anterior THR  -- Indication: For Medical Device    acetaminophen 500 mg oral tablet  -- 2 tab(s) by mouth every 12 hours for 2 more weeks.  -- Indication: For Pain    oxyCODONE 5 mg oral tablet  -- 1 tab(s) by mouth every 3 hours, As needed, Mild Pain (1 - 3) MDD:8  -- Indication: For Pain    dilTIAZem 240 mg/24 hours oral tablet, extended release  -- 1 tab(s) by mouth once a day  -- Indication: For Home Med    apixaban 5 mg oral tablet  -- 1 tab(s) by mouth every 12 hours  -- Indication: For Blood clot Prevention    gabapentin 100 mg oral capsule  -- orally 2 times a day  -- Indication: For Home Med    allopurinol 300 mg oral tablet  -- 1 tab(s) by mouth once a day  -- Indication: For Home Med    metoprolol succinate 25 mg oral tablet, extended release  -- 1 tab(s) by mouth once a day  -- Indication: For Home Med    senna oral tablet  -- 2 tab(s) by mouth once a day (at bedtime)  -- Indication: For Constipation    docusate sodium 100 mg oral capsule  -- 1 cap(s) by mouth 3 times a day  -- Indication: For Constipation    polyethylene glycol 3350 oral powder for reconstitution  -- 17 gram(s) by mouth once a day, As needed, Constipation  -- Indication: For Constipation    pantoprazole 40 mg oral delayed release tablet  -- 1 tab(s) by mouth once a day  -- Indication: For Stomach Protection

## 2018-10-25 NOTE — OCCUPATIONAL THERAPY INITIAL EVALUATION ADULT - TRANSFER TRAINING, PT EVAL
Patient will transfer to toilet with DME as needed with supervision within 3-5 sessions.
none
Patient will transfer to toilet with DME as needed with supervision within 2-3 sessions.

## 2018-10-25 NOTE — PHYSICAL THERAPY INITIAL EVALUATION ADULT - BED MOBILITY TRAINING, PT EVAL
Goals 2-4 days, Pt will perform bed mobility Goals 2-4 days, Pt will perform bed mobility with supervision

## 2018-10-25 NOTE — PHYSICAL THERAPY INITIAL EVALUATION ADULT - GENERAL OBSERVATIONS, REHAB EVAL
pt received semisupine, +hemovac, +IV, +PAS pt received semisupine, +hemovac, +IV, +PAS, +conner catheter

## 2018-10-25 NOTE — PHYSICAL THERAPY INITIAL EVALUATION ADULT - TRANSFER TRAINING, PT EVAL
Goals 2-4 days, Pt will perform sit to stand w/ rolling walker Goals 2-4 days, Pt will perform sit to stand w/ rolling walker w/ supervision

## 2018-10-25 NOTE — DISCHARGE NOTE ADULT - CARE PLAN
Principal Discharge DX:	Primary osteoarthritis of right hip  Goal:	Improve function and quality of life with reduced hip pain  Assessment and plan of treatment:	Your new total hip replacement requires proper care.  Your surgical care provider is your best resource for information.  Your Physical Therapy /Occupational Therapy will include Ambulation, Transfers , Stairs, ADLs (activities of daily living), range of motion, and isometrics.  Your participation is vital for the fullest recovery and best results.  You may bear full weight as tolerated with rolling walker, cane or assistive device.    TOTAL HIP PRECAUTIONS  No straight leg raise  No external rotation of hip when extended-standing or lying flat  No hyperextension of hip when standing (kickback).  Do not take a tub bath yet.   Do not resume driving until you have your surgeon’s permission.     Keep incision clean and dry.  Change the dressing daily if there is drainage noted.  When there is no drainage the wound may be open to air.   The wound is closed with either sutures (stiches) or Prineo (glued on mesh tape.)  Both sutures and Prineo are removed 2 weeks after surgery at rehab facility or in surgeon's office.  If there is no wet drainage you may shower and pat dry with a clean towel.  Assessment and plan of treatment:	For Constipation :   • Increase your daily water intake.   • Try adding fiber to your diet by eating fruits, vegetables and foods that are rich in grains.  • If you do experience constipation, you may take an over-the-counter stool softener/laxative such as Colace, Senokot , Milk of Magnesia or Miralax.  - Call your doctor if you experience:  • An increase in pain not controlled by pain medication or change in activity or  position.  • Temperature greater than 101° F.  • Redness, increased swelling or foul smelling drainage from or around the surgical  incision.  • Numbness, tingling or a change in color or temperature of the operative leg.  • Call your doctor immediately if you experience chest pain, shortness of breath or calf pain.

## 2018-10-25 NOTE — OCCUPATIONAL THERAPY INITIAL EVALUATION ADULT - PLANNED THERAPY INTERVENTIONS, OT EVAL
ADL retraining/bed mobility training/transfer training
bed mobility training/transfer training/ADL retraining

## 2018-10-25 NOTE — PHYSICAL THERAPY INITIAL EVALUATION ADULT - GAIT TRAINING, PT EVAL
Goals 2-4 days, Pt will ambulate Goals 2-4 days, Pt will ambulate 75 ft w/ rolling walker w/ supervision

## 2018-10-25 NOTE — DISCHARGE NOTE ADULT - OTHER SIGNIFICANT FINDINGS
Please follow up with your private medical doctor after 6 weeks of Eliquis for further management of your atrial fibrillation. (resume Pradaxa OR continue Eliquis???)

## 2018-10-25 NOTE — DISCHARGE NOTE ADULT - PLAN OF CARE
Improve function and quality of life with reduced hip pain Your new total hip replacement requires proper care.  Your surgical care provider is your best resource for information.  Your Physical Therapy /Occupational Therapy will include Ambulation, Transfers , Stairs, ADLs (activities of daily living), range of motion, and isometrics.  Your participation is vital for the fullest recovery and best results.  You may bear full weight as tolerated with rolling walker, cane or assistive device.    TOTAL HIP PRECAUTIONS  No straight leg raise  No external rotation of hip when extended-standing or lying flat  No hyperextension of hip when standing (kickback).  Do not take a tub bath yet.   Do not resume driving until you have your surgeon’s permission.     Keep incision clean and dry.  Change the dressing daily if there is drainage noted.  When there is no drainage the wound may be open to air.   The wound is closed with either sutures (stiches) or Prineo (glued on mesh tape.)  Both sutures and Prineo are removed 2 weeks after surgery at rehab facility or in surgeon's office.  If there is no wet drainage you may shower and pat dry with a clean towel. For Constipation :   • Increase your daily water intake.   • Try adding fiber to your diet by eating fruits, vegetables and foods that are rich in grains.  • If you do experience constipation, you may take an over-the-counter stool softener/laxative such as Colace, Senokot , Milk of Magnesia or Miralax.  - Call your doctor if you experience:  • An increase in pain not controlled by pain medication or change in activity or  position.  • Temperature greater than 101° F.  • Redness, increased swelling or foul smelling drainage from or around the surgical  incision.  • Numbness, tingling or a change in color or temperature of the operative leg.  • Call your doctor immediately if you experience chest pain, shortness of breath or calf pain.

## 2018-10-25 NOTE — DISCHARGE NOTE ADULT - PATIENT PORTAL LINK FT
You can access the [facility], offered by [facility], by registering with the following website: [email or web address]

## 2018-10-25 NOTE — BRIEF OPERATIVE NOTE - COMMENTS
implants : acet 58 mm crown cluster cup w/ 1 screw and +5 lateralized xle liner, femur #16 standard offset collarless stem w/ 36+10 co-cr head

## 2018-10-25 NOTE — PRE-OP CHECKLIST - CHLOROHEXIDINE WASH IN ASU
Pt did not attend Recreation group at 2015. Pt did not attend Wrap Up group at 2100.     Electronically signed by Ezequiel Cleaning on 10/18/2018 at 10:33 PM
25-Oct-2018 07:00

## 2018-10-25 NOTE — DISCHARGE NOTE ADULT - NS AS ACTIVITY OBS
Walking-Outdoors allowed/Do not drive or operate machinery/may shower when hip wounds completely dry/Stairs allowed/Showering allowed/Walking-Indoors allowed

## 2018-10-25 NOTE — DISCHARGE NOTE ADULT - CARE PROVIDER_API CALL
UNA Hooker (MD), Orthopaedic Surgery  [address] [place] [address]  Phone: [phone]  Fax: [phone]

## 2018-10-25 NOTE — DISCHARGE NOTE ADULT - NSFTFSERV1RD_GEN_ALL_CORE
rehabilitation services/medication teaching and assessment/teaching and training/wound care and assessment/observation and assessment

## 2018-10-25 NOTE — DISCHARGE NOTE ADULT - HOSPITAL COURSE
This 87 yo male patient was admitted to [facility] on 10/25/18 with a history of severe degenerative joint disease of the right hip and for elective total joint replacement.  Patient had appropriate preop medical evaluation and testing.    Patient received antibiotics according to SCIP guidelines for infection prevention.  The patient underwent an uncomplicated anterior right total hip replacement by Dr. Gabriel Hooker on 10/25.   Eliquis was given for DVT prophylaxis and for management of atrial fibrillation.  Anesthesia, Medical Hospitalist, Physical Therapy and Occupational Therapy were consulted.  Patient is stable for discharge home with home care services and with a good prognosis on ***.  Appropriate discharge instructions and medications are provided in this document.

## 2018-10-25 NOTE — CONSULT NOTE ADULT - ASSESSMENT
POD#0 s/p RIGHT THR  - Pain control  - Bowel regimen  - PT/OT  - DVT PPx    Chronic Afib  -     Gout  -     Neuropathy  -     Chronic Back Pain  - POD#0 s/p RIGHT THR  - Pain control  - Bowel regimen  - PT/OT  - DVT PPx - Eliquis  - EBL of ~ 1 Liter, repeat CBC at 2pm and transfuse if needed    Chronic Afib  - continue cardizem and metoprolol with hold parameters  - Eliquis instead of pradaxa (he has not taken pradaxa for last 3 days)    Gout  - continue allopurinol    Neuropathy  - continue gapapentin

## 2018-10-25 NOTE — PHARMACOTHERAPY INTERVENTION NOTE - COMMENTS
patient takes Pradaxa 150mg BID for AF. Spoke with patient's prescriber and she is OK with switch to Eliquis postoperatively. I will discuss with patient in AM and determine if he will take Eliquis for 2 or 4 weeks after surgery before reinitiating Pradaxa.

## 2018-10-25 NOTE — DISCHARGE NOTE ADULT - COMMUNITY RESOURCES
Nurse to visit day after discharge.  If you have any problems, please contact the agency at the above number.

## 2018-10-25 NOTE — OCCUPATIONAL THERAPY INITIAL EVALUATION ADULT - ADL RETRAINING, OT EVAL
Patient will dress lower body with set-up , AE as needed within 2-3 sessions.
Patient will dress lower body with set-up , AE as needed within 3-5 sessions.

## 2018-10-25 NOTE — BRIEF OPERATIVE NOTE - PROCEDURE
<<-----Click on this checkbox to enter Procedure Right total hip arthroplasty  10/25/2018  via anterior approach  Active  LOGATA

## 2018-10-26 LAB
ANION GAP SERPL CALC-SCNC: 2 MMOL/L — LOW (ref 5–17)
BUN SERPL-MCNC: 18 MG/DL — SIGNIFICANT CHANGE UP (ref 7–23)
CALCIUM SERPL-MCNC: 8.5 MG/DL — SIGNIFICANT CHANGE UP (ref 8.4–10.5)
CHLORIDE SERPL-SCNC: 102 MMOL/L — SIGNIFICANT CHANGE UP (ref 96–108)
CO2 SERPL-SCNC: 29 MMOL/L — SIGNIFICANT CHANGE UP (ref 22–31)
CREAT SERPL-MCNC: 1.15 MG/DL — SIGNIFICANT CHANGE UP (ref 0.5–1.3)
GLUCOSE SERPL-MCNC: 108 MG/DL — HIGH (ref 70–99)
HCT VFR BLD CALC: 25.3 % — LOW (ref 39–50)
HGB BLD-MCNC: 8.4 G/DL — LOW (ref 13–17)
MAGNESIUM SERPL-MCNC: 1.8 MG/DL — SIGNIFICANT CHANGE UP (ref 1.6–2.6)
MCHC RBC-ENTMCNC: 31.9 PG — SIGNIFICANT CHANGE UP (ref 27–34)
MCHC RBC-ENTMCNC: 33.2 GM/DL — SIGNIFICANT CHANGE UP (ref 32–36)
MCV RBC AUTO: 96.2 FL — SIGNIFICANT CHANGE UP (ref 80–100)
NRBC # BLD: 0 /100 WBCS — SIGNIFICANT CHANGE UP (ref 0–0)
PLATELET # BLD AUTO: 184 K/UL — SIGNIFICANT CHANGE UP (ref 150–400)
POTASSIUM SERPL-MCNC: 4.6 MMOL/L — SIGNIFICANT CHANGE UP (ref 3.5–5.3)
POTASSIUM SERPL-SCNC: 4.6 MMOL/L — SIGNIFICANT CHANGE UP (ref 3.5–5.3)
RBC # BLD: 2.63 M/UL — LOW (ref 4.2–5.8)
RBC # FLD: 12.1 % — SIGNIFICANT CHANGE UP (ref 10.3–14.5)
SODIUM SERPL-SCNC: 133 MMOL/L — LOW (ref 135–145)
WBC # BLD: 7.01 K/UL — SIGNIFICANT CHANGE UP (ref 3.8–10.5)
WBC # FLD AUTO: 7.01 K/UL — SIGNIFICANT CHANGE UP (ref 3.8–10.5)

## 2018-10-26 PROCEDURE — 99233 SBSQ HOSP IP/OBS HIGH 50: CPT

## 2018-10-26 RX ORDER — PANTOPRAZOLE SODIUM 20 MG/1
1 TABLET, DELAYED RELEASE ORAL
Qty: 30 | Refills: 1 | OUTPATIENT
Start: 2018-10-26 | End: 2018-12-24

## 2018-10-26 RX ORDER — METOPROLOL TARTRATE 50 MG
50 TABLET ORAL DAILY
Qty: 0 | Refills: 0 | Status: DISCONTINUED | OUTPATIENT
Start: 2018-10-27 | End: 2018-10-28

## 2018-10-26 RX ORDER — APIXABAN 2.5 MG/1
1 TABLET, FILM COATED ORAL
Qty: 60 | Refills: 0 | OUTPATIENT
Start: 2018-10-26 | End: 2018-11-24

## 2018-10-26 RX ORDER — METOPROLOL TARTRATE 50 MG
25 TABLET ORAL ONCE
Qty: 0 | Refills: 0 | Status: COMPLETED | OUTPATIENT
Start: 2018-10-26 | End: 2018-10-26

## 2018-10-26 RX ADMIN — Medication 100 MILLIGRAM(S): at 05:47

## 2018-10-26 RX ADMIN — OXYCODONE HYDROCHLORIDE 10 MILLIGRAM(S): 5 TABLET ORAL at 05:47

## 2018-10-26 RX ADMIN — PANTOPRAZOLE SODIUM 40 MILLIGRAM(S): 20 TABLET, DELAYED RELEASE ORAL at 05:48

## 2018-10-26 RX ADMIN — GABAPENTIN 100 MILLIGRAM(S): 400 CAPSULE ORAL at 05:47

## 2018-10-26 RX ADMIN — Medication 1000 MILLIGRAM(S): at 17:09

## 2018-10-26 RX ADMIN — APIXABAN 2.5 MILLIGRAM(S): 2.5 TABLET, FILM COATED ORAL at 08:32

## 2018-10-26 RX ADMIN — Medication 25 MILLIGRAM(S): at 05:47

## 2018-10-26 RX ADMIN — Medication 1000 MILLIGRAM(S): at 08:35

## 2018-10-26 RX ADMIN — OXYCODONE HYDROCHLORIDE 10 MILLIGRAM(S): 5 TABLET ORAL at 11:14

## 2018-10-26 RX ADMIN — Medication 100 MILLIGRAM(S): at 21:53

## 2018-10-26 RX ADMIN — GABAPENTIN 100 MILLIGRAM(S): 400 CAPSULE ORAL at 17:08

## 2018-10-26 RX ADMIN — OXYCODONE HYDROCHLORIDE 5 MILLIGRAM(S): 5 TABLET ORAL at 17:07

## 2018-10-26 RX ADMIN — OXYCODONE HYDROCHLORIDE 10 MILLIGRAM(S): 5 TABLET ORAL at 06:17

## 2018-10-26 RX ADMIN — Medication 300 MILLIGRAM(S): at 12:27

## 2018-10-26 RX ADMIN — OXYCODONE HYDROCHLORIDE 10 MILLIGRAM(S): 5 TABLET ORAL at 10:43

## 2018-10-26 RX ADMIN — Medication 25 MILLIGRAM(S): at 10:40

## 2018-10-26 RX ADMIN — Medication 100 MILLIGRAM(S): at 16:57

## 2018-10-26 RX ADMIN — Medication 400 MILLIGRAM(S): at 02:46

## 2018-10-26 RX ADMIN — Medication 1000 MILLIGRAM(S): at 08:32

## 2018-10-26 RX ADMIN — APIXABAN 2.5 MILLIGRAM(S): 2.5 TABLET, FILM COATED ORAL at 21:53

## 2018-10-26 RX ADMIN — Medication 25 MILLIGRAM(S): at 12:50

## 2018-10-26 RX ADMIN — Medication 1000 MILLIGRAM(S): at 17:08

## 2018-10-26 RX ADMIN — OXYCODONE HYDROCHLORIDE 5 MILLIGRAM(S): 5 TABLET ORAL at 17:37

## 2018-10-26 RX ADMIN — SENNA PLUS 2 TABLET(S): 8.6 TABLET ORAL at 21:53

## 2018-10-26 RX ADMIN — Medication 1000 MILLIGRAM(S): at 02:46

## 2018-10-26 NOTE — CONSULT NOTE ADULT - ASSESSMENT
The patient is an 86 year old male with a history of HTN, gout, chronic atrial fibrillation who is admitted s/p right THR.    Plan:  - Tachycardia likely multifactorial (dehydration, pain, anemia)  - Continue IV fluids today  - Pain control  - Will give extra dose of metoprolol succinate 25 mg today and increase dose to 50 mg daily  - Continue diltiazem  mg daily  - Continue apixaban (eventually 5 mg bid)

## 2018-10-26 NOTE — CONSULT NOTE ADULT - SUBJECTIVE AND OBJECTIVE BOX
History of Present Illness: The patient is an 86 year old male with a history of HTN, gout, chronic atrial fibrillation who is admitted s/p right THR. He feels well and denies chest pain, shortness of breath, palpitations, dizziness. He feels a little dry. He has been noted to be tachycardic to 110-120 overnight and this morning.    Past Medical/Surgical History:  HTN, gout, chronic atrial fibrillation    Medications:  MEDICATIONS  (STANDING):  acetaminophen   Tablet .. 1000 milliGRAM(s) Oral every 8 hours  allopurinol 300 milliGRAM(s) Oral daily  apixaban 2.5 milliGRAM(s) Oral every 12 hours  diltiazem    milliGRAM(s) Oral daily  docusate sodium 100 milliGRAM(s) Oral three times a day  gabapentin 100 milliGRAM(s) Oral two times a day  lactated ringers. 1000 milliLiter(s) (125 mL/Hr) IV Continuous <Continuous>  metoprolol succinate ER 25 milliGRAM(s) Oral once  metoprolol succinate ER 25 milliGRAM(s) Oral daily  pantoprazole    Tablet 40 milliGRAM(s) Oral daily  senna 2 Tablet(s) Oral at bedtime    MEDICATIONS  (PRN):  aluminum hydroxide/magnesium hydroxide/simethicone Suspension 30 milliLiter(s) Oral four times a day PRN Indigestion  HYDROmorphone  Injectable 0.5 milliGRAM(s) IV Push every 4 hours PRN Severe Pain (7 - 10)  magnesium hydroxide Suspension 30 milliLiter(s) Oral daily PRN Constipation  ondansetron Injectable 4 milliGRAM(s) IV Push every 6 hours PRN Nausea and/or Vomiting  oxyCODONE    IR 5 milliGRAM(s) Oral every 3 hours PRN Mild Pain (1 - 3)  oxyCODONE    IR 10 milliGRAM(s) Oral every 3 hours PRN Moderate Pain (4 - 6)  polyethylene glycol 3350 17 Gram(s) Oral daily PRN Constipation      Family History: Non-contributory family history of premature cardiovascular atherosclerotic disease    Social History: No tobacco, alcohol or drug use    Review of Systems:  General: No fevers, chills, weight loss or gain  Skin: No rashes, color changes  Cardiovascular: No chest pain, orthopnea  Respiratory: No shortness of breath, cough  Gastrointestinal: No nausea, abdominal pain  Genitourinary: No incontinence, pain with urination  Musculoskeletal: No pain, swelling, decreased range of motion  Neurological: No headache, weakness  Psychiatric: No depression, anxiety  Endocrine: No weight loss or gain, increased thirst  All other systems are comprehensively negative.    Physical Exam:  Vitals:        Vital Signs Last 24 Hrs  T(C): 36.6 (26 Oct 2018 07:50), Max: 36.7 (25 Oct 2018 11:45)  T(F): 97.8 (26 Oct 2018 07:50), Max: 98.1 (25 Oct 2018 11:45)  HR: 96 (26 Oct 2018 07:50) (77 - 108)  BP: 143/70 (26 Oct 2018 07:50) (91/56 - 143/70)  BP(mean): --  RR: 16 (26 Oct 2018 07:50) (12 - 20)  SpO2: 96% (26 Oct 2018 07:50) (94% - 100%)  General: NAD  HEENT: MMM  Neck: No JVD, no carotid bruit  Lungs: CTAB  CV: RRR, nl S1/S2, no M/R/G  Abdomen: S/NT/ND, +BS  Extremities: No LE edema, no cyanosis  Neuro: AAOx3, non-focal  Skin: No rash    Labs:                        8.4    7.01  )-----------( 184      ( 26 Oct 2018 08:11 )             25.3     10-26    133<L>  |  102  |  18  ----------------------------<  108<H>  4.6   |  29  |  1.15    Ca    8.5      26 Oct 2018 08:11          PT/INR - ( 25 Oct 2018 13:58 )   PT: 13.8 sec;   INR: 1.26 ratio         PTT - ( 25 Oct 2018 13:58 )  PTT:30.1 sec    ECG: Atrial fibrillation, rates 110-120

## 2018-10-27 LAB
ANION GAP SERPL CALC-SCNC: 6 MMOL/L — SIGNIFICANT CHANGE UP (ref 5–17)
BUN SERPL-MCNC: 19 MG/DL — SIGNIFICANT CHANGE UP (ref 7–23)
CALCIUM SERPL-MCNC: 8.7 MG/DL — SIGNIFICANT CHANGE UP (ref 8.4–10.5)
CHLORIDE SERPL-SCNC: 101 MMOL/L — SIGNIFICANT CHANGE UP (ref 96–108)
CO2 SERPL-SCNC: 29 MMOL/L — SIGNIFICANT CHANGE UP (ref 22–31)
CREAT SERPL-MCNC: 1.19 MG/DL — SIGNIFICANT CHANGE UP (ref 0.5–1.3)
GLUCOSE SERPL-MCNC: 116 MG/DL — HIGH (ref 70–99)
HCT VFR BLD CALC: 24.7 % — LOW (ref 39–50)
HGB BLD-MCNC: 8.3 G/DL — LOW (ref 13–17)
MCHC RBC-ENTMCNC: 32.3 PG — SIGNIFICANT CHANGE UP (ref 27–34)
MCHC RBC-ENTMCNC: 33.6 GM/DL — SIGNIFICANT CHANGE UP (ref 32–36)
MCV RBC AUTO: 96.1 FL — SIGNIFICANT CHANGE UP (ref 80–100)
NRBC # BLD: 0 /100 WBCS — SIGNIFICANT CHANGE UP (ref 0–0)
PLATELET # BLD AUTO: 177 K/UL — SIGNIFICANT CHANGE UP (ref 150–400)
POTASSIUM SERPL-MCNC: 4.7 MMOL/L — SIGNIFICANT CHANGE UP (ref 3.5–5.3)
POTASSIUM SERPL-SCNC: 4.7 MMOL/L — SIGNIFICANT CHANGE UP (ref 3.5–5.3)
RBC # BLD: 2.57 M/UL — LOW (ref 4.2–5.8)
RBC # FLD: 12.2 % — SIGNIFICANT CHANGE UP (ref 10.3–14.5)
SODIUM SERPL-SCNC: 136 MMOL/L — SIGNIFICANT CHANGE UP (ref 135–145)
WBC # BLD: 7.95 K/UL — SIGNIFICANT CHANGE UP (ref 3.8–10.5)
WBC # FLD AUTO: 7.95 K/UL — SIGNIFICANT CHANGE UP (ref 3.8–10.5)

## 2018-10-27 PROCEDURE — 99233 SBSQ HOSP IP/OBS HIGH 50: CPT

## 2018-10-27 PROCEDURE — 12345: CPT | Mod: NC

## 2018-10-27 RX ADMIN — APIXABAN 5 MILLIGRAM(S): 2.5 TABLET, FILM COATED ORAL at 21:17

## 2018-10-27 RX ADMIN — OXYCODONE HYDROCHLORIDE 5 MILLIGRAM(S): 5 TABLET ORAL at 05:22

## 2018-10-27 RX ADMIN — Medication 50 MILLIGRAM(S): at 05:22

## 2018-10-27 RX ADMIN — GABAPENTIN 100 MILLIGRAM(S): 400 CAPSULE ORAL at 17:17

## 2018-10-27 RX ADMIN — Medication 1000 MILLIGRAM(S): at 05:23

## 2018-10-27 RX ADMIN — Medication 1000 MILLIGRAM(S): at 05:22

## 2018-10-27 RX ADMIN — Medication 300 MILLIGRAM(S): at 12:32

## 2018-10-27 RX ADMIN — PANTOPRAZOLE SODIUM 40 MILLIGRAM(S): 20 TABLET, DELAYED RELEASE ORAL at 05:22

## 2018-10-27 RX ADMIN — Medication 1000 MILLIGRAM(S): at 12:32

## 2018-10-27 RX ADMIN — Medication 1000 MILLIGRAM(S): at 21:17

## 2018-10-27 RX ADMIN — Medication 100 MILLIGRAM(S): at 13:45

## 2018-10-27 RX ADMIN — Medication 1000 MILLIGRAM(S): at 13:00

## 2018-10-27 RX ADMIN — APIXABAN 5 MILLIGRAM(S): 2.5 TABLET, FILM COATED ORAL at 09:03

## 2018-10-27 RX ADMIN — GABAPENTIN 100 MILLIGRAM(S): 400 CAPSULE ORAL at 05:22

## 2018-10-27 RX ADMIN — Medication 100 MILLIGRAM(S): at 05:22

## 2018-10-27 RX ADMIN — Medication 100 MILLIGRAM(S): at 21:17

## 2018-10-27 RX ADMIN — OXYCODONE HYDROCHLORIDE 5 MILLIGRAM(S): 5 TABLET ORAL at 05:52

## 2018-10-27 RX ADMIN — SENNA PLUS 2 TABLET(S): 8.6 TABLET ORAL at 21:17

## 2018-10-27 RX ADMIN — Medication 240 MILLIGRAM(S): at 05:22

## 2018-10-28 VITALS — HEART RATE: 78 BPM

## 2018-10-28 LAB
ANION GAP SERPL CALC-SCNC: 8 MMOL/L — SIGNIFICANT CHANGE UP (ref 5–17)
BUN SERPL-MCNC: 24 MG/DL — HIGH (ref 7–23)
CALCIUM SERPL-MCNC: 8.5 MG/DL — SIGNIFICANT CHANGE UP (ref 8.4–10.5)
CHLORIDE SERPL-SCNC: 100 MMOL/L — SIGNIFICANT CHANGE UP (ref 96–108)
CO2 SERPL-SCNC: 29 MMOL/L — SIGNIFICANT CHANGE UP (ref 22–31)
CREAT SERPL-MCNC: 1.35 MG/DL — HIGH (ref 0.5–1.3)
GLUCOSE SERPL-MCNC: 114 MG/DL — HIGH (ref 70–99)
HCT VFR BLD CALC: 25 % — LOW (ref 39–50)
HGB BLD-MCNC: 8.3 G/DL — LOW (ref 13–17)
MCHC RBC-ENTMCNC: 31.9 PG — SIGNIFICANT CHANGE UP (ref 27–34)
MCHC RBC-ENTMCNC: 33.2 GM/DL — SIGNIFICANT CHANGE UP (ref 32–36)
MCV RBC AUTO: 96.2 FL — SIGNIFICANT CHANGE UP (ref 80–100)
NRBC # BLD: 0 /100 WBCS — SIGNIFICANT CHANGE UP (ref 0–0)
PLATELET # BLD AUTO: 186 K/UL — SIGNIFICANT CHANGE UP (ref 150–400)
POTASSIUM SERPL-MCNC: 3.9 MMOL/L — SIGNIFICANT CHANGE UP (ref 3.5–5.3)
POTASSIUM SERPL-SCNC: 3.9 MMOL/L — SIGNIFICANT CHANGE UP (ref 3.5–5.3)
RBC # BLD: 2.6 M/UL — LOW (ref 4.2–5.8)
RBC # FLD: 12.1 % — SIGNIFICANT CHANGE UP (ref 10.3–14.5)
SODIUM SERPL-SCNC: 137 MMOL/L — SIGNIFICANT CHANGE UP (ref 135–145)
WBC # BLD: 6.12 K/UL — SIGNIFICANT CHANGE UP (ref 3.8–10.5)
WBC # FLD AUTO: 6.12 K/UL — SIGNIFICANT CHANGE UP (ref 3.8–10.5)

## 2018-10-28 PROCEDURE — 99233 SBSQ HOSP IP/OBS HIGH 50: CPT

## 2018-10-28 RX ORDER — OXYCODONE HYDROCHLORIDE 5 MG/1
1 TABLET ORAL
Qty: 56 | Refills: 0 | OUTPATIENT
Start: 2018-10-28 | End: 2018-11-03

## 2018-10-28 RX ADMIN — Medication 100 MILLIGRAM(S): at 05:31

## 2018-10-28 RX ADMIN — Medication 1000 MILLIGRAM(S): at 05:30

## 2018-10-28 RX ADMIN — Medication 50 MILLIGRAM(S): at 05:31

## 2018-10-28 RX ADMIN — OXYCODONE HYDROCHLORIDE 5 MILLIGRAM(S): 5 TABLET ORAL at 01:46

## 2018-10-28 RX ADMIN — Medication 240 MILLIGRAM(S): at 05:31

## 2018-10-28 RX ADMIN — GABAPENTIN 100 MILLIGRAM(S): 400 CAPSULE ORAL at 05:32

## 2018-10-28 RX ADMIN — OXYCODONE HYDROCHLORIDE 5 MILLIGRAM(S): 5 TABLET ORAL at 02:30

## 2018-10-28 RX ADMIN — PANTOPRAZOLE SODIUM 40 MILLIGRAM(S): 20 TABLET, DELAYED RELEASE ORAL at 05:31

## 2018-10-28 RX ADMIN — Medication 1000 MILLIGRAM(S): at 05:32

## 2018-10-28 RX ADMIN — APIXABAN 5 MILLIGRAM(S): 2.5 TABLET, FILM COATED ORAL at 08:14

## 2018-10-28 NOTE — PROGRESS NOTE ADULT - ATTENDING COMMENTS
Plan of care was discuss with patient, all questions were answered, seems understand and in agreement.  Patient is medically cleared for discharge pending ortho and PT clearance.   Check cbc in 1 week.

## 2018-10-28 NOTE — PROGRESS NOTE ADULT - PROVIDER SPECIALTY LIST ADULT
Cardiology
Cardiology
Hospitalist
Hospitalist
Orthopedics
Pharmacy
Orthopedics
Hospitalist

## 2018-10-28 NOTE — PROGRESS NOTE ADULT - ASSESSMENT
POD#1 s/p RIGHT THR  - Pain controlled  - Bowel regimen  - PT/OT  - DVT PPx - Eliquis  - EBL of ~ 1 Liter, transfuse if symptomatic or Hgb <= 7.5  - Sheehan out this AM, monitor for void  - DC Planning for home    Chronic Afib, Afib with RVR  - continue cardizem and metoprolol with hold parameters (got does this AM)  - HR between 110-120, occasional bursts to 150, asymptomatic  - Cardiology Consult to help with rate control  - Eliquis instead of pradaxa, patient will discuss with his cardiologist about permanently staying on Eliquis    Gout  - continue allopurinol    Neuropathy  - continue gapapentin
POD#2 s/p RIGHT THR  - Pain controlled  - Bowel regimen  - PT/OT  - DVT PPx - Eliquis  - EBL of ~ 1 Liter, transfuse if symptomatic or Hgb <= 7.5  - sp Sheehan removal. now voiding freely.   - DC Planning for home    Chronic Afib, Afib with RVR  - continue cardizem and metoprolol with hold parameters   - HR between 110-120  - Cardiology Consult noted.   - Eliquis instead of pradaxa, patient will discuss with his cardiologist about permanently staying on Eliquis    Gout  - continue allopurinol    Neuropathy  - continue gapapentin
The patient is an 86 year old male with a history of HTN, gout, chronic atrial fibrillation who is admitted s/p right THR.    Plan:  - Tachycardia likely multifactorial (dehydration, pain, anemia)  - Hemoglobin low but stable  - Pain control  - Continue metoprolol succinate 50 mg daily   - Continue diltiazem  mg daily  - Continue apixaban  - Discharge planning
The patient is an 86 year old male with a history of HTN, gout, chronic atrial fibrillation who is admitted s/p right THR.    Plan:  - Tachycardia likely multifactorial (dehydration, pain, anemia)  - Hemoglobin low but stable  - Pain control  - Continue metoprolol succinate 50 mg daily   - Continue diltiazem  mg daily  - Continue apixaban (eventually 5 mg bid)
s/p RIGHT THR  - Pain controlled  - Bowel regimen  - PT/OT  - DVT PPx - Eliquis  - EBL of ~ 1 Liter, transfuse if symptomatic or Hgb <= 7.5  - sp Sheehan removal. now voiding freely.   - DC Planning for home today.    Chronic Afib, Afib with RVR  - continue cardizem and metoprolol with hold parameters   - HR between 110-120  - Cardiology Consult noted.   - Eliquis instead of pradaxa, patient will discuss with his cardiologist about permanently staying on Eliquis    Gout  - continue allopurinol    Neuropathy  - continue gapapentin  Post op anemia due to active blood loss- check cbc prn.  Patient is asymptomatics.  check cbc in 1 week and prn.

## 2018-10-28 NOTE — PROGRESS NOTE ADULT - SUBJECTIVE AND OBJECTIVE BOX
ANSON NOBLES                                                               [id number]                                                      Allergies---No Known Allergies         Pt is a 86y year old Male s/p Right THR.   Pain is 3/10.   Tolerating the diet.   The patient is A&O x 3, resting comfortably in bed, with no complants.   Denies chest pain / shortness of breath / dyspnea / nausea / vomiting / headaches or light headed ness.         Vital Signs Last 24 Hrs  T(F): 97.9 (10-26-18 @ 03:30), Max: 98.1 (10-25-18 @ 23:30)  HR: 102 (10-26-18 @ 05:37)  BP: 116/71 (10-26-18 @ 05:37)  RR: 16 (10-26-18 @ 03:30)  SpO2: 98% (10-26-18 @ 03:30)    I&O's Detail    25 Oct 2018 07:01  -  26 Oct 2018 07:00  --------------------------------------------------------  IN:    IV PiggyBack: 150 mL    lactated ringers.: 500 mL    lactated ringers.: 1500 mL  Total IN: 2150 mL    OUT:    Accordian: 255 mL    Estimated Blood Loss: 700 mL    Indwelling Catheter - Urethral: 1000 mL  Total OUT: 1955 mL    Total NET: 195 mL        PE:   Right Lower Extremity:   Dressing is C/D/I, fixated well on the patient.   NVI.   Sensation intact to light touch distally.   No gross evidence of motor deficits.   EHL/FHL/TA intact.   +2 DP/PT pulses.   Capillary refill < 2 seconds.   Toes are pink and mobile.   Negative calf tenderness.   No evidence of impeding compartment syndrome.   PAS stockings are in place.   HV in place.        Labs Pending        A:   Pt is a 86y year old Male S/P right total hip replacement, Post Op Day #1        Plan:    - Follow up with Medicine    - OOB with PT/OT   - Incentive spirometry   - Anterior Hip precautions   - Pain control    - Labs in A.M.   - Dressing change tomorrow   - D/C planning   - DVT ppx = PAS +  apixaban 2.5 milliGRAM(s) Oral every 12 hours                                                                                                                                                                             Franky RICHARDSON
Chief Complaint: Right THR    Interval Events: Intermittently tachycardic    Review of Systems:  General: No fevers, chills, weight loss or gain  Skin: No rashes, color changes  Cardiovascular: No chest pain, orthopnea  Respiratory: No shortness of breath, cough  Gastrointestinal: No nausea, abdominal pain  Genitourinary: No incontinence, pain with urination  Musculoskeletal: No pain, swelling, decreased range of motion  Neurological: No headache, weakness  Psychiatric: No depression, anxiety  Endocrine: No weight loss or gain, increased thirst  All other systems are comprehensively negative.    Physical Exam:  Vital Signs Last 24 Hrs  T(C): 36.6 (27 Oct 2018 08:10), Max: 37.1 (27 Oct 2018 03:30)  T(F): 97.9 (27 Oct 2018 08:10), Max: 98.8 (27 Oct 2018 03:30)  HR: 96 (27 Oct 2018 08:10) (67 - 97)  BP: 94/58 (27 Oct 2018 08:10) (94/58 - 151/89)  BP(mean): --  RR: 18 (27 Oct 2018 08:10) (16 - 18)  SpO2: 99% (27 Oct 2018 08:10) (93% - 99%)  General: NAD  HEENT: MMM  Neck: No JVD, no carotid bruit  Lungs: CTAB  CV: RRR, nl S1/S2, no M/R/G  Abdomen: S/NT/ND, +BS  Extremities: No LE edema, no cyanosis  Neuro: AAOx3, non-focal  Skin: No rash    Labs:                        8.3    7.95  )-----------( 177      ( 27 Oct 2018 07:00 )             24.7     10-27    136  |  101  |  19  ----------------------------<  116<H>  4.7   |  29  |  1.19    Ca    8.7      27 Oct 2018 07:00  Mg     1.8     10-26          PT/INR - ( 25 Oct 2018 13:58 )   PT: 13.8 sec;   INR: 1.26 ratio         PTT - ( 25 Oct 2018 13:58 )  PTT:30.1 sec    Telemetry: AF, HR 
Chief Complaint: Right THR    Interval Events: No events overnight. No complaints.    Review of Systems:  General: No fevers, chills, weight loss or gain  Skin: No rashes, color changes  Cardiovascular: No chest pain, orthopnea  Respiratory: No shortness of breath, cough  Gastrointestinal: No nausea, abdominal pain  Genitourinary: No incontinence, pain with urination  Musculoskeletal: No pain, swelling, decreased range of motion  Neurological: No headache, weakness  Psychiatric: No depression, anxiety  Endocrine: No weight loss or gain, increased thirst  All other systems are comprehensively negative.    Physical Exam:  Vital Signs Last 24 Hrs  T(C): 37.1 (28 Oct 2018 08:03), Max: 37.3 (27 Oct 2018 23:20)  T(F): 98.7 (28 Oct 2018 08:03), Max: 99.1 (27 Oct 2018 23:20)  HR: 80 (28 Oct 2018 08:03) (79 - 105)  BP: 123/64 (28 Oct 2018 08:03) (93/61 - 156/89)  BP(mean): --  RR: 18 (28 Oct 2018 08:03) (15 - 18)  SpO2: 97% (28 Oct 2018 08:03) (97% - 100%)  General: NAD  HEENT: MMM  Neck: No JVD, no carotid bruit  Lungs: CTAB  CV: RRR, nl S1/S2, no M/R/G  Abdomen: S/NT/ND, +BS  Extremities: No LE edema, no cyanosis  Neuro: AAOx3, non-focal  Skin: No rash    Labs:  10-28    137  |  100  |  24<H>  ----------------------------<  114<H>  3.9   |  29  |  1.35<H>    Ca    8.5      28 Oct 2018 06:27  Mg     1.8     10-26                          8.3    6.12  )-----------( 186      ( 28 Oct 2018 06:27 )             25.0       Telemetry: AF, HR 80-90s, PVCs
Discharge medication calendar:  (Pradaxa 150mg BID preop)  Eliquis 5mg q12h x 30 days then resume Pradaxa (or discuss with cardiologist for future AF medication)  APAP 1000mg q12h x 2-3 weeks  No NSAID (per cardiologist)  Pantoprazole 40mg QAM x 6 weeks  Narcotic PRN  Docusate 100mg TID while taking narcotic  Miralax, Senna, or Bisacodyl PRN for treatment of constipation
INTERVAL HPI/OVERNIGHT EVENTS:   Patient seen and examined.  Eating, conner out this am - no void yet, no BM yet.  No fevers, chills, sweats, dizziness, HA, changes in vision, cp, palpitations, sob, persistent cough, n/v/d, abd pain, dysuria, focal weakness, or calf pain.      REVIEW OF SYSTEMS:  See HPI,  all others negative    PHYSICAL EXAM:  Vital Signs Last 24 Hrs  T(C): 36.6 (26 Oct 2018 07:50), Max: 36.7 (25 Oct 2018 11:45)  T(F): 97.8 (26 Oct 2018 07:50), Max: 98.1 (25 Oct 2018 11:45)  HR: 96 (26 Oct 2018 07:50) (77 - 108)  BP: 143/70 (26 Oct 2018 07:50) (91/56 - 143/70)  BP(mean): --  RR: 16 (26 Oct 2018 07:50) (12 - 20)  SpO2: 96% (26 Oct 2018 07:50) (94% - 100%)    GENERAL: NAD, well-groomed, well-developed, awake, alert, oriented x 3, fluent and coherent speech  EYES: EOMI, PERRLA, conjunctiva and sclera clear  ENMT: No tonsillar erythema, exudates, or enlargement; Moist mucous membranes, Good dentition, No lesions  NECK: Supple, No JVD, No Cervical LAD, No thyromegaly, No thyroid nodules felt  NERVOUS SYSTEM:  Good concentration; Moving all 4 extremities; No gross sensory deficits, No facial droop  CHEST WALL: No masses  CHEST/LUNG: Clear to auscultation bilaterally; No rales, rhonchi, wheezing, or rubs  HEART: Regular rate and rhythm; No murmurs, rubs, or gallops  ABDOMEN: Soft, Nontender, Nondistended, Bowel sounds present, No palpable masses or organomegaly, No bruits  EXTREMITIES:  2+ Peripheral Pulses, No clubbing, cyanosis, or edema, no calf tenderness  LYMPH: No lymphadenopathy  SKIN: No rashes or lesions  INCISION: dressing c/d/i    LABS:                        8.4    7.01  )-----------( 184      ( 26 Oct 2018 08:11 )             25.3     26 Oct 2018 08:11    133    |  102    |  18     ----------------------------<  108    4.6     |  29     |  1.15     Ca    8.5        26 Oct 2018 08:11      PT/INR - ( 25 Oct 2018 13:58 )   PT: 13.8 sec;   INR: 1.26 ratio         PTT - ( 25 Oct 2018 13:58 )  PTT:30.1 sec       RADIOLOGY & ADDITIONAL TESTS:
ORTHOPEDIC PA PROGRESS NOTE  ANSON GIULIANO      86y Male                                                                                                                               POD #    3d    STATUS POST:       Procedure: Right total hip arthroplasty: via anterior approach           No complaints.      Pain (0-10):  Pt reports 3  Current Pain Management:  PRN    T(F): 98.7  HR: 80  BP: 123/64  RR: 18  SpO2: 97%                         8.3    6.12  )-----------( 186      ( 28 Oct 2018 06:27 )             25.0         10-28    137  |  100  |  24<H>  ----------------------------<  114<H>  3.9   |  29  |  1.35<H>    Ca    8.5      28 Oct 2018 06:27      Physical Exam :    -   Dressing changed sterile.   -   Wound C/D/I.   -   Distal Neurvascular status intact grossly.   -   Warm well perfused; capillary refill <3 seconds   -   (+)EHL/FHL   -   (+) Sensation to light touch  -   (-) Calf tenderness Bilaterally    A/P: 86y Male s/p Right total hip arthroplasty: via anterior approach     -   Ortho Stable  -   Pain control:  PRN  -   Medicine to follow  -   DVT ppx:    PAS +  apixaban: 5 milliGRAM(s) Oral, apixaban: 5 milliGRAM(s) Oral  -   Weight bearing status: WBAT   -  Dispo:   Home today  -   Prescribed Medications:  3:1 Commode: s/p Right anterior THR  apixaban 5 mg oral tablet: 1 tab(s) orally every 12 hours  oxyCODONE 5 mg oral tablet: 1 tab(s) orally every 3 hours, As needed, Mild Pain (1 - 3) MDD:8  pantoprazole 40 mg oral delayed release tablet: 1 tab(s) orally once a day  Rolling Walker with 5inch wheels:
Ortho PA - Post Op Check - S/P - anterior right THR with spinal an IV sedation        Pt lethargic but easily arousable; No complaints of hip pain or nausea.       Vital Signs Last 24 Hrs  T(C): 36.4 (10-25-18 @ 14:26), Max: 36.7 (10-25-18 @ 11:45)  T(F): 97.5 (10-25-18 @ 14:26), Max: 98.1 (10-25-18 @ 11:45)  HR: 88 (10-25-18 @ 14:26) (77 - 95)  BP: 110/56 (10-25-18 @ 14:26) (91/56 - 110/62)  BP(mean): --  RR: 16 (10-25-18 @ 14:26) (12 - 20)  SpO2: 99% (10-25-18 @ 14:26) (94% - 100%)  I&O's Detail    25 Oct 2018 07:01  -  25 Oct 2018 14:52  --------------------------------------------------------  IN:    lactated ringers.: 1500 mL  Total IN: 1500 mL    OUT:    Accordian drain right hip: 125 mL emptied in PACU    Estimated Blood Loss: 700 mL in OR    Indwelling Catheter - Urethral: 300 mL  Total OUT: 1125 mL    Total NET: 375 mL        I&O's Summary    25 Oct 2018 07:01  -  25 Oct 2018 14:52  --------------------------------------------------------  IN: 1500 mL / OUT: 1125 mL / NET: 375 mL                                9.5    9.92  )-----------( 175      ( 25 Oct 2018 13:58 )             28.7        10-25    136  |  103  |  19  ----------------------------<  134<H>  4.3   |  28  |  1.17    Ca    8.7      25 Oct 2018 13:58        MEDICATIONS:acetaminophen  IVPB .. 1000 milliGRAM(s) IV Intermittent every 6 hours  allopurinol 300 milliGRAM(s) Oral daily  aluminum hydroxide/magnesium hydroxide/simethicone Suspension 30 milliLiter(s) Oral four times a day PRN  ceFAZolin   IVPB 2000 milliGRAM(s) IV Intermittent every 8 hours  celecoxib 200 milliGRAM(s) Oral every 12 hours  docusate sodium 100 milliGRAM(s) Oral three times a day  gabapentin 100 milliGRAM(s) Oral two times a day  HYDROmorphone  Injectable 0.5 milliGRAM(s) IV Push every 4 hours PRN  lactated ringers. 1000 milliLiter(s) IV Continuous <Continuous>  magnesium hydroxide Suspension 30 milliLiter(s) Oral daily PRN  metoprolol succinate ER 25 milliGRAM(s) Oral daily  ondansetron Injectable 4 milliGRAM(s) IV Push every 6 hours PRN  oxyCODONE    IR 5 milliGRAM(s) Oral every 3 hours PRN  oxyCODONE    IR 10 milliGRAM(s) Oral every 3 hours PRN  pantoprazole    Tablet 40 milliGRAM(s) Oral daily  polyethylene glycol 3350 17 Gram(s) Oral daily PRN  senna 2 Tablet(s) Oral at bedtime    Anticoagulation:  PAS to LE's      Antibiotics:   ceFAZolin   IVPB 2000 milliGRAM(s) IV Intermittent every 8 hours for 2 more doses post op      Pain medications:   acetaminophen  IVPB .. 1000 milliGRAM(s) IV Intermittent every 6 hours  gabapentin 100 milliGRAM(s) Oral two times a day  HYDROmorphone  Injectable 0.5 milliGRAM(s) IV Push every 4 hours PRN  ondansetron Injectable 4 milliGRAM(s) IV Push every 6 hours PRN  oxyCODONE    IR 5 milliGRAM(s) Oral every 3 hours PRN  oxyCODONE    IR 10 milliGRAM(s) Oral every 3 hours PRN          PE:  Right Hip- Primary surgical bandages/Aquacels dry and intact.  Hemovac drains intact and patent.  Feet mobile and sensate.  EHLs/ant.tibs. 5/5. DP pulse 1+ right foot.  PAS on LE's.  Calves soft and nontender.    A/P: Ortho stable  - Continue post-op orders; pain management with above plan.--  -Patient had PREop RT for H.O. prevention; NO Celebrex as per patient's cardiologist.  - Check labs today and in A.M.  -Patient with atrial fibrillation:  will switch from Pradaxa to Eliquis for 6 weeks, starting tomorrow, for DVT prevention, also.  - PT /OT for OOB, full WBAT  - Medical consult appreciated from Dr. Darby.  -Discharge planning for Home in  2 days.  -Will continue to monitor closely with attendings.
POST OPERATIVE DAY #: 2    86y Male  s/p Right  Anterior THR                        SUBJECTIVE: Patient seen and examined at bedside.     Pain:  well controlled     Pain scale:  4 /10  Denies CP, SOB, N/V/D, weakness, numbness   No new complains     OBJECTIVE:     Vital Signs Last 24 Hrs  T(C): 36.6 (27 Oct 2018 08:10), Max: 37.1 (27 Oct 2018 03:30)  T(F): 97.9 (27 Oct 2018 08:10), Max: 98.8 (27 Oct 2018 03:30)  HR: 96 (27 Oct 2018 08:10) (67 - 97)  BP: 94/58 (27 Oct 2018 08:10) (94/58 - 151/89)  BP(mean): --  RR: 18 (27 Oct 2018 08:10) (16 - 18)  SpO2: 99% (27 Oct 2018 08:10) (93% - 99%)    Affected extremity: RLE         Dressing: changed, incision clean/dry/intact                     HV intact removed         Sensation: intact to light touch          Motor exam:  5 / 5 Tibialis Anterior/Gastrocnemius-Soleus, EHL/FHL         warm, well-perfused; capillary refill < 3 seconds         negative calf tenderness B/L LE    LABS:                        8.3    7.95  )-----------( 177      ( 27 Oct 2018 07:00 )             24.7     10-27    136  |  101  |  19  ----------------------------<  116<H>  4.7   |  29  |  1.19    Ca    8.7      27 Oct 2018 07:00  Mg     1.8     10-26          MEDICATIONS:      Pain management:  acetaminophen   Tablet .. 1000 milliGRAM(s) Oral every 8 hours  gabapentin 100 milliGRAM(s) Oral two times a day  HYDROmorphone  Injectable 0.5 milliGRAM(s) IV Push every 4 hours PRN  ondansetron Injectable 4 milliGRAM(s) IV Push every 6 hours PRN  oxyCODONE    IR 5 milliGRAM(s) Oral every 3 hours PRN  oxyCODONE    IR 10 milliGRAM(s) Oral every 3 hours PRN    DVT prophylaxis:   apixaban 5 milliGRAM(s) Oral every 12 hours      RADIOLOGY & ADDITIONAL STUDIES:    ASSESSMENT AND PLAN:     - Analgesic pain control  - DVT prophylaxis:    Eliquis 5mg twice a day   SCDs       - HO prophylaxis: Preop RT  - PT/OT: Weight Bearing Status:  Weight bearing as tolerated, OOBTC           Anterior Hip precautions      -  Incentive spirometry  - IVF  - Advance diet as tolerated  - Hospitalist is following  -  Follow up labs  -  Disposition: Home
Patient is a 86y old  Male who presents with a chief complaint of Elective right total hip replacement (25 Oct 2018 15:05) (26 Oct 2018 09:27)    HPI:  85 yo male with pmh of htn, afib, neuropathy, gout, presented for right hip replacement. Pt with hx of right hip pain with radiation to the groin since August 2018. Pt is sp physical therapy w/o relief. Notes pain is worse with walking and beinding. Pain is 8/10 on severity.     Today:  Pt denies any pain. Notes to have -120. Pt notes palpitations x1.     REVIEW OF SYSTEMS  General: no lethargy	  Skin/Breast: no rash  Ophthalmologic: no blurry vision  ENMT:	no sore throat, no ear pain  Respiratory and Thorax:	no sob, no cough, wheezing  Cardiovascular:	no chest pain, +ve palpitations, no lower extremity swelling  Gastrointestinal:	no nausea, no vomiting,   Genitourinary: no dysuria, no frequency	  Musculoskeletal:	 no muscle pain  Neurological: no weakness  Psychiatric: no anxiety	  Hematology/Lymphatics:	 no bruising  Endocrine: no increased thirst, no change in appetite    PAST MEDICAL & SURGICAL HISTORY:  Chronic back pain  Neuropathy: bilateral feet  Osteoarthritis of right hip  Hypertension  Atrial fibrillation  Gout  History of appendectomy: 2017    MEDICATIONS  (STANDING):  acetaminophen   Tablet .. 1000 milliGRAM(s) Oral every 8 hours  allopurinol 300 milliGRAM(s) Oral daily  apixaban 5 milliGRAM(s) Oral every 12 hours  diltiazem    milliGRAM(s) Oral daily  docusate sodium 100 milliGRAM(s) Oral three times a day  gabapentin 100 milliGRAM(s) Oral two times a day  lactated ringers. 1000 milliLiter(s) (125 mL/Hr) IV Continuous <Continuous>  metoprolol succinate ER 50 milliGRAM(s) Oral daily  pantoprazole    Tablet 40 milliGRAM(s) Oral daily  senna 2 Tablet(s) Oral at bedtime    MEDICATIONS  (PRN):  aluminum hydroxide/magnesium hydroxide/simethicone Suspension 30 milliLiter(s) Oral four times a day PRN Indigestion  bisacodyl Suppository 10 milliGRAM(s) Rectal daily PRN If no bowel movement by POD#2  HYDROmorphone  Injectable 0.5 milliGRAM(s) IV Push every 4 hours PRN Severe Pain (7 - 10)  magnesium hydroxide Suspension 30 milliLiter(s) Oral daily PRN Constipation  ondansetron Injectable 4 milliGRAM(s) IV Push every 6 hours PRN Nausea and/or Vomiting  oxyCODONE    IR 5 milliGRAM(s) Oral every 3 hours PRN Mild Pain (1 - 3)  oxyCODONE    IR 10 milliGRAM(s) Oral every 3 hours PRN Moderate Pain (4 - 6)  polyethylene glycol 3350 17 Gram(s) Oral daily PRN Constipation    EXAM:  Vital Signs Last 24 Hrs  T(C): 36.4 (27 Oct 2018 11:27), Max: 37.1 (27 Oct 2018 03:30)  T(F): 97.6 (27 Oct 2018 11:27), Max: 98.8 (27 Oct 2018 03:30)  HR: 79 (27 Oct 2018 11:27) (67 - 97)  BP: 93/61 (27 Oct 2018 11:27) (93/61 - 151/89)  BP(mean): --  RR: 18 (27 Oct 2018 11:27) (17 - 18)  SpO2: 98% (27 Oct 2018 11:27) (96% - 99%)    10-26 @ 07:01  -  10-27 @ 07:00  --------------------------------------------------------  IN: 0 mL / OUT: 1301 mL / NET: -1301 mL    PHYSICAL EXAM:  Constitutional: awake sitting in chair   ENMT: patent nares, moist mucus membranes  Neck: supple  Respiratory: bilaterally clear to auscultation, no wheezing, no rhonchi, no crackles, no decreased air entry  Cardiovascular: s1s2, rrr, no murmurs.   Gastrointestinal: soft, non tender, +bowel sounds, no rebound, no guarding.   Extremities: +ve RLE edema. Right hip incision with dressing x2.   Neurological: alert and oriented to person, date and place.   Skin:  no rash, warm to touch.   Musculoskeletal: moves all 4 extremities  Psychiatric: no homicidal, suicidal ideation. appropriate affect.     LABS:  PT/INR - ( 25 Oct 2018 13:58 )   PT: 13.8 sec;   INR: 1.26 ratio    PTT - ( 25 Oct 2018 13:58 )  PTT:30.1 sec                        8.3    7.95  )-----------( 177      ( 27 Oct 2018 07:00 )             24.7   10-27  136  |  101  |  19  ----------------------------<  116<H>  4.7   |  29  |  1.19  Ca    8.7      27 Oct 2018 07:00  Mg     1.8     10-26    Chart reviewed.   Labs reviewed.  Imaging reviewed.   Plan discussed with consultants.
Patient is a 86y old  Male who presents with a chief complaint of Right hip replacement (27 Oct 2018 13:12)      INTERVAL HPI/OVERNIGHT EVENTS:  Pt is seen and examined.  Pain is controlled.  Ramón any dizziness, chest pain, sob, palpitation, or active bleeding.    Pain Location & Control:     MEDICATIONS  (STANDING):  acetaminophen   Tablet .. 1000 milliGRAM(s) Oral every 8 hours  allopurinol 300 milliGRAM(s) Oral daily  apixaban 5 milliGRAM(s) Oral every 12 hours  diltiazem    milliGRAM(s) Oral daily  docusate sodium 100 milliGRAM(s) Oral three times a day  gabapentin 100 milliGRAM(s) Oral two times a day  lactated ringers. 1000 milliLiter(s) (125 mL/Hr) IV Continuous <Continuous>  metoprolol succinate ER 50 milliGRAM(s) Oral daily  pantoprazole    Tablet 40 milliGRAM(s) Oral daily  senna 2 Tablet(s) Oral at bedtime    MEDICATIONS  (PRN):  aluminum hydroxide/magnesium hydroxide/simethicone Suspension 30 milliLiter(s) Oral four times a day PRN Indigestion  bisacodyl Suppository 10 milliGRAM(s) Rectal daily PRN If no bowel movement by POD#2  HYDROmorphone  Injectable 0.5 milliGRAM(s) IV Push every 4 hours PRN Severe Pain (7 - 10)  magnesium hydroxide Suspension 30 milliLiter(s) Oral daily PRN Constipation  ondansetron Injectable 4 milliGRAM(s) IV Push every 6 hours PRN Nausea and/or Vomiting  oxyCODONE    IR 5 milliGRAM(s) Oral every 3 hours PRN Mild Pain (1 - 3)  oxyCODONE    IR 10 milliGRAM(s) Oral every 3 hours PRN Moderate Pain (4 - 6)  polyethylene glycol 3350 17 Gram(s) Oral daily PRN Constipation      Allergies    No Known Allergies    Intolerances            Vital Signs Last 24 Hrs  T(C): 37.1 (28 Oct 2018 08:03), Max: 37.3 (27 Oct 2018 23:20)  T(F): 98.7 (28 Oct 2018 08:03), Max: 99.1 (27 Oct 2018 23:20)  HR: 80 (28 Oct 2018 08:03) (79 - 105)  BP: 123/64 (28 Oct 2018 08:03) (93/61 - 156/89)  BP(mean): --  RR: 18 (28 Oct 2018 08:03) (15 - 18)  SpO2: 97% (28 Oct 2018 08:03) (97% - 100%)        LABS:                        8.3    6.12  )-----------( 186      ( 28 Oct 2018 06:27 )             25.0     28 Oct 2018 06:27    137    |  100    |  24     ----------------------------<  114    3.9     |  29     |  1.35     Ca    8.5        28 Oct 2018 06:27          RADIOLOGY & ADDITIONAL TESTS:    Imaging Personally Reviewed:  [ ] YES  [ ] NO    Consultant(s) Notes Reviewed:  [x ] YES  [ ] NO    Care Discussed with Consultants/Other Providers [x ] YES  [ ] NO

## 2018-10-28 NOTE — PROGRESS NOTE ADULT - REASON FOR ADMISSION
Right hip replacement
Patient is a 86y old  Male who presents with a chief complaint of Elective right total hip replacement (25 Oct 2018 15:05)
Right hip replacement

## 2018-10-30 LAB — SURGICAL PATHOLOGY FINAL REPORT - CH: SIGNIFICANT CHANGE UP

## 2018-11-07 ENCOUNTER — APPOINTMENT (OUTPATIENT)
Dept: ORTHOPEDIC SURGERY | Facility: CLINIC | Age: 83
End: 2018-11-07
Payer: MEDICARE

## 2018-11-07 ENCOUNTER — OUTPATIENT (OUTPATIENT)
Dept: OUTPATIENT SERVICES | Facility: HOSPITAL | Age: 83
LOS: 1 days | End: 2018-11-07
Payer: MEDICARE

## 2018-11-07 ENCOUNTER — APPOINTMENT (OUTPATIENT)
Dept: ULTRASOUND IMAGING | Facility: CLINIC | Age: 83
End: 2018-11-07
Payer: MEDICARE

## 2018-11-07 DIAGNOSIS — Z00.8 ENCOUNTER FOR OTHER GENERAL EXAMINATION: ICD-10-CM

## 2018-11-07 DIAGNOSIS — Z90.49 ACQUIRED ABSENCE OF OTHER SPECIFIED PARTS OF DIGESTIVE TRACT: Chronic | ICD-10-CM

## 2018-11-07 PROCEDURE — 93971 EXTREMITY STUDY: CPT

## 2018-11-07 PROCEDURE — 93971 EXTREMITY STUDY: CPT | Mod: 26,RT

## 2018-11-07 PROCEDURE — 73502 X-RAY EXAM HIP UNI 2-3 VIEWS: CPT | Mod: RT

## 2018-11-07 PROCEDURE — 99024 POSTOP FOLLOW-UP VISIT: CPT

## 2018-11-11 PROCEDURE — 36415 COLL VENOUS BLD VENIPUNCTURE: CPT

## 2018-11-11 PROCEDURE — 88311 DECALCIFY TISSUE: CPT

## 2018-11-11 PROCEDURE — 85027 COMPLETE CBC AUTOMATED: CPT

## 2018-11-11 PROCEDURE — 97116 GAIT TRAINING THERAPY: CPT

## 2018-11-11 PROCEDURE — 97165 OT EVAL LOW COMPLEX 30 MIN: CPT

## 2018-11-11 PROCEDURE — 85730 THROMBOPLASTIN TIME PARTIAL: CPT

## 2018-11-11 PROCEDURE — 88305 TISSUE EXAM BY PATHOLOGIST: CPT

## 2018-11-11 PROCEDURE — 85610 PROTHROMBIN TIME: CPT

## 2018-11-11 PROCEDURE — 80048 BASIC METABOLIC PNL TOTAL CA: CPT

## 2018-11-11 PROCEDURE — 76000 FLUOROSCOPY <1 HR PHYS/QHP: CPT

## 2018-11-11 PROCEDURE — 97110 THERAPEUTIC EXERCISES: CPT

## 2018-11-11 PROCEDURE — C1889: CPT

## 2018-11-11 PROCEDURE — 97161 PT EVAL LOW COMPLEX 20 MIN: CPT

## 2018-11-11 PROCEDURE — 97530 THERAPEUTIC ACTIVITIES: CPT

## 2018-11-11 PROCEDURE — 97535 SELF CARE MNGMENT TRAINING: CPT

## 2018-11-11 PROCEDURE — 94664 DEMO&/EVAL PT USE INHALER: CPT

## 2018-11-11 PROCEDURE — 83735 ASSAY OF MAGNESIUM: CPT

## 2018-11-11 PROCEDURE — C1776: CPT

## 2018-11-11 PROCEDURE — C1713: CPT

## 2018-12-05 ENCOUNTER — APPOINTMENT (OUTPATIENT)
Dept: ORTHOPEDIC SURGERY | Facility: CLINIC | Age: 83
End: 2018-12-05
Payer: MEDICARE

## 2018-12-05 PROCEDURE — 99024 POSTOP FOLLOW-UP VISIT: CPT

## 2019-02-06 ENCOUNTER — APPOINTMENT (OUTPATIENT)
Dept: ORTHOPEDIC SURGERY | Facility: CLINIC | Age: 84
End: 2019-02-06
Payer: MEDICARE

## 2019-02-06 PROCEDURE — 99213 OFFICE O/P EST LOW 20 MIN: CPT

## 2019-02-06 NOTE — HISTORY OF PRESENT ILLNESS
[de-identified] : 86 year old male s/p Right total hip replacement October 25, 2018. Preoperative pain has markedly improved. He is symptom free walking. Patient is ambulating without a cane. He is currently in physical therapy, and notes occasional right knee pain after strengthening exercises.

## 2019-02-06 NOTE — PHYSICAL EXAM
[de-identified] : Well-nourished male, in no acute distress\par Alert and oriented to time, place and person\par Skin: no lesions discoloration\par Respirations: unlabored\par Cardiac: no leg swelling\par Lymphatic: no groin adenopathy\par Right Hip: incision well healed, leg lengths equal, satisfactory gait, Passive ROM pain free, neurovascular grossly intact, no local tenderness.

## 2019-02-06 NOTE — ADDENDUM
[FreeTextEntry1] : I, Lino Anahi, acted solely as a scribe for Dr. Gabriel Hooker on 02/06/2019 .\par \par All medical record entries made by the scribe were at my, Dr. Gabriel Hooker, direction and personally dictated by me on 02/06/2019 . I have reviewed the chart and agree that the record accurately reflects my personal performance of the history, physical exam, assessment and plan. I have also personally directed, reviewed, and agreed with the chart.

## 2019-02-06 NOTE — CONSULT LETTER
[Dear  ___] : Dear  [unfilled], [Please see my note below.] : Please see my note below. [Sincerely,] : Sincerely, [FreeTextEntry3] : Dr. Gabriel Hooker

## 2019-02-06 NOTE — DISCUSSION/SUMMARY
[de-identified] : He will refrain from repetitive impact activity. He may DC therapy and continue home exercises. FU in 6 months with xrays.

## 2019-09-18 ENCOUNTER — APPOINTMENT (OUTPATIENT)
Dept: ORTHOPEDIC SURGERY | Facility: CLINIC | Age: 84
End: 2019-09-18
Payer: MEDICARE

## 2019-09-18 DIAGNOSIS — Z96.642 PRESENCE OF LEFT ARTIFICIAL HIP JOINT: ICD-10-CM

## 2019-09-18 PROCEDURE — 99212 OFFICE O/P EST SF 10 MIN: CPT

## 2019-09-18 NOTE — ADDENDUM
[FreeTextEntry1] : I, Gustavo Cui, acted solely as a scribe for Dr. Gabriel Hooker on 09/18/2019  .\par  \par All medical record entries made by the scribe were at my, Dr. Gabriel Hooker, direction and personally dictated by me on 09/18/2019. I have reviewed the chart and agree that the record accurately reflects my personal performance of the history, physical exam, assessment and plan. I have also personally directed, reviewed, and agreed with the chart.\par

## 2019-09-18 NOTE — HISTORY OF PRESENT ILLNESS
[de-identified] : 86 year old male s/p Right total hip replacement October 25, 2018. Preoperative pain has markedly improved. He is symptom free walking. Denies groin/thigh pain. Ambulating independently. Occasional discomfort right hip when exercising in gym. Patient is ambulating without a cane.

## 2019-09-18 NOTE — CONSULT LETTER
[Dear  ___] : Dear  [unfilled], [Sincerely,] : Sincerely, [Please see my note below.] : Please see my note below. [FreeTextEntry3] : Dr. Hooker

## 2019-09-18 NOTE — PHYSICAL EXAM
[de-identified] : Well-nourished male, in no acute distress\par Alert and oriented to time, place and person\par Skin: no lesions discoloration\par Respirations: unlabored\par Cardiac: no leg swelling\par Lymphatic: no groin adenopathy\par Right Hip: incision well healed, leg lengths equal, satisfactory gait, Passive ROM pain free, neurovascular grossly intact, no local tenderness.  [de-identified] : No new images were taken in the office today.\par

## 2019-09-18 NOTE — DISCUSSION/SUMMARY
[de-identified] : Doing well s/p right THR. He will refrain from repetitive impact activity. Continue home exercises.

## 2019-10-31 NOTE — PRE-OP CHECKLIST - INTERNAL PROSTHESES
Iron def anemia- I sent rx for iron  Low vitamin d- needs vitamin d 2000 units daily  Monitor subclinical hypothyroidism  Ok to let pt know
no

## 2019-11-07 ENCOUNTER — OUTPATIENT (OUTPATIENT)
Dept: OUTPATIENT SERVICES | Facility: HOSPITAL | Age: 84
LOS: 1 days | End: 2019-11-07
Payer: MEDICARE

## 2019-11-07 ENCOUNTER — APPOINTMENT (OUTPATIENT)
Dept: MRI IMAGING | Facility: CLINIC | Age: 84
End: 2019-11-07

## 2019-11-07 DIAGNOSIS — Z00.8 ENCOUNTER FOR OTHER GENERAL EXAMINATION: ICD-10-CM

## 2019-11-07 DIAGNOSIS — Z90.49 ACQUIRED ABSENCE OF OTHER SPECIFIED PARTS OF DIGESTIVE TRACT: Chronic | ICD-10-CM

## 2019-11-07 PROCEDURE — 72148 MRI LUMBAR SPINE W/O DYE: CPT | Mod: 26

## 2019-11-07 PROCEDURE — 72148 MRI LUMBAR SPINE W/O DYE: CPT

## 2019-12-09 ENCOUNTER — APPOINTMENT (OUTPATIENT)
Dept: ORTHOPEDIC SURGERY | Facility: CLINIC | Age: 84
End: 2019-12-09
Payer: MEDICARE

## 2019-12-09 DIAGNOSIS — M25.531 PAIN IN RIGHT WRIST: ICD-10-CM

## 2019-12-09 DIAGNOSIS — M19.031 PRIMARY OSTEOARTHRITIS, RIGHT WRIST: ICD-10-CM

## 2019-12-09 PROCEDURE — 99213 OFFICE O/P EST LOW 20 MIN: CPT

## 2019-12-09 PROCEDURE — 73110 X-RAY EXAM OF WRIST: CPT | Mod: RT

## 2019-12-09 RX ORDER — APIXABAN 5 MG/1
5 TABLET, FILM COATED ORAL
Qty: 180 | Refills: 0 | Status: ACTIVE | COMMUNITY
Start: 2019-09-22

## 2019-12-09 RX ORDER — METHYLPREDNISOLONE 4 MG/1
4 TABLET ORAL
Qty: 21 | Refills: 0 | Status: ACTIVE | COMMUNITY
Start: 2019-10-28

## 2019-12-09 RX ORDER — METOPROLOL SUCCINATE 25 MG/1
25 TABLET, EXTENDED RELEASE ORAL
Qty: 90 | Refills: 0 | Status: ACTIVE | COMMUNITY
Start: 2019-10-28

## 2019-12-09 RX ORDER — DILTIAZEM HYDROCHLORIDE 240 MG/1
240 CAPSULE, EXTENDED RELEASE ORAL
Qty: 90 | Refills: 0 | Status: ACTIVE | COMMUNITY
Start: 2019-09-25

## 2019-12-09 NOTE — HISTORY OF PRESENT ILLNESS
[Right] : right hand dominant [FreeTextEntry1] : Patient is a RHD 87 year old male who presents c/o right wrist pain x 3 days.  He had pain intermittently prior to this but it was not severe.  Denies any recent injury but admits to injuries from his younger year. The pain became aggravated after he went to the gym last week.  He has since had a throbbing sensation which is present at rest.   The pain has improved since Thursday.  He ices the wrist 3 x day.  He has pain with all ROM of the wrist.  The pain radiates into his hand.  He is not currently taking anything for pain. He denies numbness or tingling in the hand. \par \par

## 2019-12-09 NOTE — ADDENDUM
[FreeTextEntry1] : I, Brie Dietrich wrote this note acting as a scribe for Dr. Reese Rosenthal on Dec 09, 2019.

## 2019-12-09 NOTE — END OF VISIT
[FreeTextEntry3] : I, Reese Rosenthal MD, ordering physician, have read and attest that all the information, medical decision making and discharge instructions within are true and accurate.

## 2019-12-09 NOTE — PHYSICAL EXAM
[de-identified] : Patient is WDWN, alert, and in no acute distress. Breathing is unlabored. He is grossly oriented to person, place, and time. \par \par Right Wrist: Swelling over the dorsal aspect of the wrist. There is tenderness to palpation along the dorsal radiocarpal joint space. No deformities. There is no joint instability on provocative testing. \par Range of Motion: Flexion 30 °, extension 30 °, supination 60 °, pronation is full. \par Strength: extension, flexion, ulnar deviation and radial deviation 5/5.  [de-identified] : AP, lateral and oblique views of the right wrist were obtained today and revealed no acute fracture or dislocation. 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 Cystic changes are present along the distal radius, distal ulna and the adjacent carpal bones.

## 2019-12-09 NOTE — DISCUSSION/SUMMARY
[FreeTextEntry1] : The underlying pathophysiology was reviewed with the patient. Treatment options were discussed including; observation, NSAIDS, analgesics, bracing, injection(s). \par \par Patient was advised to modify his workouts and to avoid activities and gym exercises known to cause pain and aggravate his symptoms. He should wear a wrist brace for additional support. \par Topical analgesics as needed.\par NSAIDs as tolerated. \par He may return for a cortisone injection in the future if his pain persists or worsens. \par

## 2022-11-07 ENCOUNTER — APPOINTMENT (OUTPATIENT)
Dept: ORTHOPEDIC SURGERY | Facility: CLINIC | Age: 87
End: 2022-11-07
Payer: MEDICARE

## 2022-11-07 VITALS — WEIGHT: 197 LBS | BODY MASS INDEX: 25.28 KG/M2 | HEIGHT: 74 IN

## 2022-11-07 DIAGNOSIS — Z96.641 PRESENCE OF RIGHT ARTIFICIAL HIP JOINT: ICD-10-CM

## 2022-11-07 PROCEDURE — 72170 X-RAY EXAM OF PELVIS: CPT | Mod: 59,RT

## 2022-11-07 PROCEDURE — 99214 OFFICE O/P EST MOD 30 MIN: CPT

## 2022-11-07 PROCEDURE — 73502 X-RAY EXAM HIP UNI 2-3 VIEWS: CPT

## 2022-11-07 NOTE — DISCUSSION/SUMMARY
[de-identified] : Impression, differential diagnosis includes lumbar GEN disc disease with referred pain lateral pelvis versus early polyethylene wear with secondary osteolysis\par Recommendation I discussed MRI of the right hip..  Patient would like to defer MRI for now and see how he manages symptomatically.  Follow-up

## 2022-11-07 NOTE — HISTORY OF PRESENT ILLNESS
[de-identified] : 89-year-old male who presents complaining of right hip.  Patient status post right anterior total replacement 2018.  Doing well until recent onset of "the right hip not feeling right".  Describes sensation of "like a stretch" lateral aspect right hip that is intermittent during the day unpredictable provocation and a brief duration.  He is symptom-free when he is walking and denies groin or thigh pain he does experience chronic low back pain.  He has just come from exercising in the gym for an hour and a half with no complaints.

## 2022-11-07 NOTE — PHYSICAL EXAM
[de-identified] : alert and oriented to time place and person.Appropriate affect \par Skin:Head, neck, arms and lower extremities:no lesions or discoloration\par HEENT: Normocephalic, EOM intact, Nasal septum midline,\par Respiratory: Unlabored respirations,no audible wheezing ,no tachypnea, no cyanosis\par Cardiovascular: no leg swelling  no ankle edema no JVD, pulse regular\par Vascular: no calf or thigh tenderness, \par Peripheral pulses; intact\par Lymphatics:No groin adenopathy,no lymphedema lower  or upper extremities\par Right hip satisfactory gait leg lengths equal passive range of motion pain-free flexion 110 internal 15 external 40 abduction 40 adduction 35, no local tenderness resisted hip abduction 5/5 pain-free [de-identified] : X-ray AP pelvis right hip AP lateral reveals maintenance of satisfactory component alignment there is suggestion of lucencies zones 1 2 and 3 at the acetabular metal bone interface versus projection.

## 2022-12-16 NOTE — H&P PST ADULT - NEGATIVE RESPIRATORY AND THORAX SYMPTOMS
Rechecked patient's vitals per providers orders, patient current vitals are:    BP: 118/60 RUE, Sitting, Large Adult cuff           112/68 LUE, supine, Large Adult cuff           108/74 LUE, Standing, Large Adult cuff    Pulse: 105 bpm ( while sitting)               94    bpm ( while lying)               113 bpm ( while standing)      Temp: 99.5 F Oral     Respiration: 18     SpO2: 96% ( while sitting, lying, and standing)    Provider notified, Patient states she does feel slightly better after receiving ibuprofen.    no dyspnea/no wheezing/no cough

## 2023-03-10 ENCOUNTER — APPOINTMENT (OUTPATIENT)
Dept: ORTHOPEDIC SURGERY | Facility: CLINIC | Age: 88
End: 2023-03-10
Payer: MEDICARE

## 2023-03-10 VITALS — HEIGHT: 74 IN | WEIGHT: 200 LBS | BODY MASS INDEX: 25.67 KG/M2

## 2023-03-10 DIAGNOSIS — M19.011 PRIMARY OSTEOARTHRITIS, RIGHT SHOULDER: ICD-10-CM

## 2023-03-10 DIAGNOSIS — M25.511 PAIN IN RIGHT SHOULDER: ICD-10-CM

## 2023-03-10 DIAGNOSIS — M25.561 PAIN IN RIGHT KNEE: ICD-10-CM

## 2023-03-10 DIAGNOSIS — M25.562 PAIN IN RIGHT KNEE: ICD-10-CM

## 2023-03-10 DIAGNOSIS — I51.9 HEART DISEASE, UNSPECIFIED: ICD-10-CM

## 2023-03-10 DIAGNOSIS — G62.9 POLYNEUROPATHY, UNSPECIFIED: ICD-10-CM

## 2023-03-10 PROCEDURE — 73562 X-RAY EXAM OF KNEE 3: CPT | Mod: LT

## 2023-03-10 PROCEDURE — 99204 OFFICE O/P NEW MOD 45 MIN: CPT

## 2023-03-10 PROCEDURE — 73030 X-RAY EXAM OF SHOULDER: CPT | Mod: RT

## 2023-03-10 NOTE — PHYSICAL EXAM
[NL (45)] : extension 45 degrees [NL (0-90)] : full external rotation 0-90 degrees [Positive] : positive anterior draw [5mm] : openinmm [Left] : left knee [NL (140)] : flexion 140 degrees [NL (0)] : extension 0 degrees [5___] : hamstring 5[unfilled]/5 [Normal Mood and Affect] : normal mood and affect [Able to Communicate] : able to communicate [Well Developed] : well developed [Well Nourished] : well nourished [Right] : right shoulder [Glenohumeral arthritis] : Glenohumeral arthritis [Bilateral] : knee bilaterally [AP] : anteroposterior [Lateral] : lateral [[place]] : skyline [Degenerative change] : Degenerative change [de-identified] : -Briana\par Lateral apprehension. [FreeTextEntry1] : Severe GH narrowing [TWNoteComboBox4] : passive forward flexion 160 degrees [TWNoteComboBox6] : internal rotation L4 [] : non-antalgic [FreeTextEntry9] : Right - mild medial narrowing, severe lateral narrowing.  Patella spurs.  Left - Mild lateral narrowing, severe medial narrowing.  Patella spurs.

## 2023-03-10 NOTE — DISCUSSION/SUMMARY
[de-identified] : "Written by Tracy White, acting as Scribe for Faheem Cote MD."\par \par Dr. Cote - \par The documentation recorded by the scribe accurately reflects the service I personally performed and the decisions made by me.

## 2023-03-10 NOTE — HISTORY OF PRESENT ILLNESS
[Nothing helps with pain getting better] : Nothing helps with pain getting better [Retired] : Work status: retired [de-identified] : 03/10/23:  Initial visit for this 89 year male RHD here today for bilateral knee and right shoulder pain.  He exercises three days a week and has pain in his knees when walking on the treadmill that started a few weeks ago.  Also pain in his right shoulder when exercising and that began about two months ago. \par \par PMH:  Pain in both knees for years.  Had been under care of another ortho MD.  Tried gel injections without much relief back then.  Some balance issues over the last two years.  \par **On Eliquis.  No diabetes. [] : no [FreeTextEntry1] : right shoulder  and bilateral knees [de-identified] : activity -knees/ reaching - shoulder (4) no apparent problem

## 2023-03-10 NOTE — REASON FOR VISIT
[FreeTextEntry2] : Dull right shoulder pain past 2 months with pain level 1 active and 0 rest/  Increased sharp bilateral  knee pain past 6 months with pain level 8 active and 0 at rest.

## 2023-03-10 NOTE — ASSESSMENT
[FreeTextEntry1] : Severe GH narrowing, right shoulder.\par Moderate-severe OA right knee, moderate left.

## 2023-03-30 ENCOUNTER — APPOINTMENT (OUTPATIENT)
Dept: ORTHOPEDIC SURGERY | Facility: CLINIC | Age: 88
End: 2023-03-30
Payer: MEDICARE

## 2023-03-30 PROCEDURE — 20610 DRAIN/INJ JOINT/BURSA W/O US: CPT | Mod: RT

## 2023-03-30 PROCEDURE — 99213 OFFICE O/P EST LOW 20 MIN: CPT | Mod: 25

## 2023-03-30 NOTE — PHYSICAL EXAM
[Normal Mood and Affect] : normal mood and affect [Able to Communicate] : able to communicate [Well Developed] : well developed [Well Nourished] : well nourished [NL (45)] : extension 45 degrees [NL (0-90)] : full external rotation 0-90 degrees [Glenohumeral arthritis] : Glenohumeral arthritis [Right] : right knee [Positive] : positive anterior draw [5mm] : openinmm [Left] : left knee [NL (140)] : flexion 140 degrees [5___] : hamstring 5[unfilled]/5 [Bilateral] : knee bilaterally [AP] : anteroposterior [Lateral] : lateral [[place]] : skyline [Degenerative change] : Degenerative change [NL (0)] : extension 0 degrees [de-identified] : -Briana\par Lateral apprehension. [FreeTextEntry1] : Severe GH narrowing [TWNoteComboBox4] : passive forward flexion 160 degrees [TWNoteComboBox6] : internal rotation L4 [] : non-antalgic [FreeTextEntry9] : Right - mild medial narrowing, severe lateral narrowing.  Patella spurs.  Left - Mild lateral narrowing, severe medial narrowing.  Patella spurs.

## 2023-03-30 NOTE — DISCUSSION/SUMMARY
[de-identified] : "Written by Tracy White, acting as Scribe for Faheem Cote MD."\par \par Dr. Cote - \par The documentation recorded by the scribe accurately reflects the service I personally performed and the decisions made by me.

## 2023-03-30 NOTE — PROCEDURE
[Large Joint Injection] : Large joint injection [Right] : of the right [Knee] : knee [Pain] : pain [X-ray evidence of Osteoarthritis on this or prior visit] : x-ray evidence of Osteoarthritis on this or prior visit [Betadine] : betadine [Ethyl Chloride sprayed topically] : ethyl chloride sprayed topically [___ cc    1%] : Lidocaine ~Vcc of 1%  [___ cc    40mg] : Methylprednisolone (Depomedrol) ~Vcc of 40 mg  [] : Patient tolerated procedure well [Call if redness, pain or fever occur] : call if redness, pain or fever occur [Apply ice for 15min out of every hour for the next 12-24 hours as tolerated] : apply ice for 15 minutes out of every hour for the next 12-24 hours as tolerated [Patient had decreased mobility in the joint] : patient had decreased mobility in the joint

## 2023-03-30 NOTE — HISTORY OF PRESENT ILLNESS
[Nothing helps with pain getting better] : Nothing helps with pain getting better [Retired] : Work status: retired [de-identified] : 03/30/23:  Returns today with continued right knee pain.  Cannot take NSAIDs due to afib.  Would like to discuss cortisone injection. \par \par 03/10/23:  Initial visit for this 89 year male RHD here today for bilateral knee and right shoulder pain.  He exercises three days a week and has pain in his knees when walking on the treadmill that started a few weeks ago.  Also pain in his right shoulder when exercising and that began about two months ago. \par \par PMH:  Pain in both knees for years.  Had been under care of another ortho MD.  Tried gel injections without much relief back then.  Some balance issues over the last two years.  \par **On Eliquis.  No diabetes. [] : no [FreeTextEntry1] : right shoulder  and bilateral knees [de-identified] : activity -knees/ reaching - shoulder

## 2023-05-18 NOTE — H&P PST ADULT - FAMILY HISTORY
Collaborative Care (Co[facility])  Progress Note    Type of Interaction: In Office    Start Time: 2:00 PM    End Time: 2:43 PM        Appointment: Scheduled    Reason for Visit:   Chief Complaint   Patient presents with    Follow-up    Anxiety        Interval History / Patient Symptoms:     Patient Health Questionnaire-9 Score: 4 (5/18/2023  2:58 PM)  DMITRI-7 Total Score: 9 (5/18/2023  2:57 PM)        Interventions Provided: Psychoeducation and Motivational Interviewing      Progress Made: N/A    Response to Intervention: Discussed consulting psychiatrist recommendations for sertraline 25mg.  Explored patient's concerns, discussed side effects, and weighed the pro's and con's of trying the med.  Patient is agreeable to try the medication to see if sx are more manageable.          Plan:         Follow Up / Next Appointment: Next appointment: 05/26/23       No pertinent family history in first degree relatives

## 2023-07-28 ENCOUNTER — APPOINTMENT (OUTPATIENT)
Dept: ORTHOPEDIC SURGERY | Facility: CLINIC | Age: 88
End: 2023-07-28
Payer: MEDICARE

## 2023-07-28 VITALS — WEIGHT: 195 LBS | BODY MASS INDEX: 25.03 KG/M2 | HEIGHT: 74 IN

## 2023-07-28 PROCEDURE — 99213 OFFICE O/P EST LOW 20 MIN: CPT | Mod: 25

## 2023-07-28 PROCEDURE — L1833: CPT | Mod: KV,KX,RT

## 2023-07-28 PROCEDURE — 20610 DRAIN/INJ JOINT/BURSA W/O US: CPT | Mod: RT

## 2023-07-28 NOTE — HISTORY OF PRESENT ILLNESS
[de-identified] : 07/28/23:  Patient returns today complaining of recurrent right knee pain after carrying a heavy item upstairs x 1 week ago. Has been applying ice. Last cortisone injection 3 months ago which helped.\par \par 03/30/23:  Returns today with continued right knee pain.  Cannot take NSAIDs due to afib.  Would like to discuss cortisone injection. \par \par 03/10/23:  Initial visit for this 89 year male RHD here today for bilateral knee and right shoulder pain.  He exercises three days a week and has pain in his knees when walking on the treadmill that started a few weeks ago.  Also pain in his right shoulder when exercising and that began about two months ago. \par \par PMH:  Pain in both knees for years.  Had been under care of another ortho MD.  Tried gel injections without much relief back then.  Some balance issues over the last two years.  \par **On Eliquis.  No diabetes.

## 2023-07-28 NOTE — PHYSICAL EXAM
[Normal Mood and Affect] : normal mood and affect [Able to Communicate] : able to communicate [Well Developed] : well developed [Well Nourished] : well nourished [NL (45)] : extension 45 degrees [NL (0-90)] : full external rotation 0-90 degrees [Glenohumeral arthritis] : Glenohumeral arthritis [Right] : right knee [Positive] : positive anterior draw [5mm] : openinmm [Left] : left knee [NL (140)] : flexion 140 degrees [NL (0)] : extension 0 degrees [5___] : hamstring 5[unfilled]/5 [Bilateral] : knee bilaterally [AP] : anteroposterior [Lateral] : lateral [[place]] : skyline [Degenerative change] : Degenerative change [de-identified] : -Briana\par Lateral apprehension. [FreeTextEntry1] : Severe GH narrowing [TWNoteComboBox4] : passive forward flexion 160 degrees [TWNoteComboBox6] : internal rotation L4 [] : non-antalgic [FreeTextEntry9] : Right - mild medial narrowing, severe lateral narrowing.  Patella spurs.  Left - Mild lateral narrowing, severe medial narrowing.  Patella spurs.

## 2023-12-15 ENCOUNTER — EMERGENCY (EMERGENCY)
Facility: HOSPITAL | Age: 88
LOS: 1 days | Discharge: HOME | End: 2023-12-15
Attending: STUDENT IN AN ORGANIZED HEALTH CARE EDUCATION/TRAINING PROGRAM | Admitting: EMERGENCY MEDICINE
Payer: MEDICARE

## 2023-12-15 VITALS
TEMPERATURE: 98 F | HEIGHT: 74 IN | HEART RATE: 81 BPM | OXYGEN SATURATION: 97 % | DIASTOLIC BLOOD PRESSURE: 80 MMHG | SYSTOLIC BLOOD PRESSURE: 138 MMHG | RESPIRATION RATE: 18 BRPM | WEIGHT: 195.11 LBS

## 2023-12-15 VITALS
RESPIRATION RATE: 18 BRPM | DIASTOLIC BLOOD PRESSURE: 90 MMHG | HEART RATE: 85 BPM | SYSTOLIC BLOOD PRESSURE: 140 MMHG | OXYGEN SATURATION: 98 %

## 2023-12-15 DIAGNOSIS — Z90.49 ACQUIRED ABSENCE OF OTHER SPECIFIED PARTS OF DIGESTIVE TRACT: Chronic | ICD-10-CM

## 2023-12-15 PROCEDURE — 71045 X-RAY EXAM CHEST 1 VIEW: CPT

## 2023-12-15 PROCEDURE — 99284 EMERGENCY DEPT VISIT MOD MDM: CPT

## 2023-12-15 PROCEDURE — 99283 EMERGENCY DEPT VISIT LOW MDM: CPT | Mod: 25

## 2023-12-15 PROCEDURE — 0225U NFCT DS DNA&RNA 21 SARSCOV2: CPT

## 2023-12-15 PROCEDURE — 71045 X-RAY EXAM CHEST 1 VIEW: CPT | Mod: 26

## 2023-12-15 NOTE — ED PROVIDER NOTE - PATIENT PORTAL LINK FT
You can access the FollowMyHealth Patient Portal offered by [facility] by registering at the following website: [email or web address]. By joining [employer]’s FollowMyHealth portal, you will also be able to view your health information using other applications (apps) compatible with our system. You can access the FollowMyHealth Patient Portal offered by [facility] by registering at the following website: [email or web address]. By joining [employer]’s FollowMyHealth portal, you will also be able to view your health information using other applications (apps) compatible with our system.

## 2023-12-15 NOTE — ED PROVIDER NOTE - PROGRESS NOTE DETAILS
Sabrina Spivey MD, Attending  pt ambulated out of ED with wife without assistance of signs of distress.

## 2023-12-15 NOTE — ED ADULT NURSE NOTE - NSFALLUNIVINTERV_ED_ALL_ED
Bed/Stretcher in lowest position, wheels locked, appropriate side rails in place/Call bell, personal items and telephone in reach/Instruct patient to call for assistance before getting out of bed/chair/stretcher/Non-slip footwear applied when patient is off stretcher/[place] to call system/Physically safe environment - no spills, clutter or unnecessary equipment/Purposeful proactive rounding/Room/bathroom lighting operational, light cord in reach Bed/Stretcher in lowest position, wheels locked, appropriate side rails in place/Call bell, personal items and telephone in reach/Instruct patient to call for assistance before getting out of bed/chair/stretcher/Non-slip footwear applied when patient is off stretcher/[place] to call system/Physically safe environment - no spills, clutter or unnecessary equipment/Purposeful proactive rounding/Room/bathroom lighting operational, light cord in reach

## 2023-12-15 NOTE — ED PROVIDER NOTE - PHYSICAL EXAMINATION
Gen: Well appearing in NAD   Head: NC/AT  Neck: trachea midline  Resp:  No distress, clear to auscultation b/l, no wheeze, no rhonchi, no rales  Ext: no deformities  Neuro:  A&O appears non focal  Skin:  Warm and dry as visualized  Psych:  Normal affect and mood

## 2023-12-15 NOTE — ED PROVIDER NOTE - NSFOLLOWUPINSTRUCTIONS_ED_ALL_ED_FT
** Your preliminary xray did not show a pneumonia. If the radiologists sees one, we will contact you. Your viral panel is pending. We will contact you if anything is detected.       ** Follow up with your primary care doctor in the next 72 hours.     ** Go to the nearest Emergency Department if you experience any new or concerning symptoms, such as:   - chest pain  - difficulty breathing  - passing out  - unable to eat or drink  - unable to move or feel part of your body  - fever, chills

## 2023-12-15 NOTE — ED ADULT NURSE NOTE - NSICDXPASTMEDICALHX_GEN_ALL_CORE_FT
PAST MEDICAL HISTORY:  Atrial fibrillation     Chronic back pain     Gout     Hypertension     Neuropathy bilateral feet    Osteoarthritis of right hip

## 2023-12-15 NOTE — ED PROVIDER NOTE - CLINICAL SUMMARY MEDICAL DECISION MAKING FREE TEXT BOX
ddx includes, but is not limited to the following: viral URI, viral PNA, bacterial PNA.     plan: RVP and CXR. Anticipate DC.

## 2023-12-15 NOTE — ED PROVIDER NOTE - OBJECTIVE STATEMENT
89-year-old male presents with 3 days of cough.  Patient states he thinks it is viral, but was concerned that he was getting a pneumonia so wanted to be sure.  No fever, no chills, no shortness of breath, no GI symptoms, no  symptoms.

## 2023-12-15 NOTE — ED ADULT NURSE NOTE - OBJECTIVE STATEMENT
Pt is alert, came to the ER due to productive cough with yellow phlegm for 5 days now. No nausea , vomiting , chest pain or fever. Covid vaccinated.

## 2023-12-16 LAB
RAPID RVP RESULT: DETECTED
RAPID RVP RESULT: DETECTED
RV+EV RNA SPEC QL NAA+PROBE: DETECTED
RV+EV RNA SPEC QL NAA+PROBE: DETECTED
SARS-COV-2 RNA SPEC QL NAA+PROBE: SIGNIFICANT CHANGE UP
SARS-COV-2 RNA SPEC QL NAA+PROBE: SIGNIFICANT CHANGE UP

## 2023-12-22 NOTE — CHART NOTE - NSCHARTNOTEFT_GEN_A_CORE
89 y o male presenting to the ED on 12/15 complaining of cough.  SW made a courtesy call to assist with scheduling the recommended primary care appointment and spoke with spouse, Rose.  Rose reported that the patient was feeling better and declined assistance with scheduling primary appointment.  Contact information was provided for further assistance if required.

## 2024-01-09 ENCOUNTER — APPOINTMENT (OUTPATIENT)
Dept: ORTHOPEDIC SURGERY | Facility: CLINIC | Age: 89
End: 2024-01-09
Payer: MEDICARE

## 2024-01-09 VITALS — HEIGHT: 75 IN | BODY MASS INDEX: 24.25 KG/M2 | WEIGHT: 195 LBS

## 2024-01-09 DIAGNOSIS — M17.12 UNILATERAL PRIMARY OSTEOARTHRITIS, LEFT KNEE: ICD-10-CM

## 2024-01-09 PROCEDURE — 20610 DRAIN/INJ JOINT/BURSA W/O US: CPT | Mod: RT

## 2024-01-09 PROCEDURE — 99213 OFFICE O/P EST LOW 20 MIN: CPT | Mod: 25

## 2024-04-23 NOTE — H&P PST ADULT - NEGATIVE ALLERGY TYPES
Home
no reactions to food/no outdoor environmental allergies/no indoor environmental allergies/no reactions to medicines

## 2024-04-29 ENCOUNTER — APPOINTMENT (OUTPATIENT)
Dept: ORTHOPEDIC SURGERY | Facility: CLINIC | Age: 89
End: 2024-04-29
Payer: MEDICARE

## 2024-04-29 VITALS — HEIGHT: 75 IN | WEIGHT: 195 LBS | BODY MASS INDEX: 24.25 KG/M2

## 2024-04-29 DIAGNOSIS — M17.11 UNILATERAL PRIMARY OSTEOARTHRITIS, RIGHT KNEE: ICD-10-CM

## 2024-04-29 PROCEDURE — 20610 DRAIN/INJ JOINT/BURSA W/O US: CPT | Mod: RT

## 2024-04-29 PROCEDURE — 99213 OFFICE O/P EST LOW 20 MIN: CPT | Mod: 25

## 2024-04-29 NOTE — PHYSICAL EXAM
[Normal Mood and Affect] : normal mood and affect [Able to Communicate] : able to communicate [Well Developed] : well developed [Well Nourished] : well nourished [Right] : right knee [Positive] : positive anterior draw [5mm] : openinmm [Left] : left knee [NL (140)] : flexion 140 degrees [NL (0)] : extension 0 degrees [5___] : hamstring 5[unfilled]/5 [Bilateral] : knee bilaterally [AP] : anteroposterior [Lateral] : lateral [[place]] : skyline [Degenerative change] : Degenerative change [] : non-antalgic [FreeTextEntry9] : Right - mild medial narrowing, severe lateral narrowing.  Patella spurs.  Left - Mild lateral narrowing, severe medial narrowing.  Patella spurs.

## 2024-04-29 NOTE — HISTORY OF PRESENT ILLNESS
[5] : 5 [0] : 0 [Ice] : ice [Steroid] : Steroid [de-identified] : 04/29/24:  Returns today c/o recurring rt knee pain x last 4 days duration. Worse walking. Now 3 months after last cortisone injection.  01/09/24:  Patient returns today complaining of recurrent right knee pain over five months after cortisone injection. Did not use hinge knee brace.  07/28/23:  Patient returns today complaining of recurrent right knee pain after carrying a heavy item upstairs x 1 week ago. Has been applying ice. Last cortisone injection 3 months ago which helped.  03/30/23:  Returns today with continued right knee pain.  Cannot take NSAIDs due to afib.  Would like to discuss cortisone injection.   03/10/23:  Initial visit for this 89 year male RHD here today for bilateral knee and right shoulder pain.  He exercises three days a week and has pain in his knees when walking on the treadmill that started a few weeks ago.  Also pain in his right shoulder when exercising and that began about two months ago.   PMH:  Pain in both knees for years.  Had been under care of another ortho MD.  Tried gel injections without much relief back then.  Some balance issues over the last two years.   **On Eliquis.  No diabetes. [] : Post Surgical Visit: no [FreeTextEntry1] : right knee [FreeTextEntry6] : heavy [FreeTextEntry9] : compression sleeve [de-identified] : 07/28/24

## 2024-04-29 NOTE — PLAN
[TextEntry] : The natural progression of osteoarthritis was explained to the patient.  We discussed the possible treatment options from conservative to operative.  These included NSAIDs, Glucosamine and Chondroitin sulfate, and physical therapy as well different types of injections.  We also discussed that at some point they may progress to need a TKA.  Information and pamphlets were given.  Medication was injected into the above treated area. After verbal consent using sterile preparation and technique. The risks, benefits, and alternatives to cortisone injection were explained in full to the patient. Risks outlined include but are not limited to infection, sepsis, bleeding, scarring, skin discoloration, temporary increase in pain, syncopal episode, failure to resolve symptoms, allergic reaction, symptom recurrence, and elevation of blood sugar in diabetics. Patient understood the risks. All questions were answered. After discussion of options, patient requested an injection. Oral informed consent was obtained and sterile prep was done of the injection site. Sterile technique was utilized for the procedure including the preparation of the solutions used for the injection. Patient tolerated the procedure well. Advised to ice the injection site this evening. Prep with Betadine locally to site. Sterile technique used. Patient tolerated procedure well. Post Procedure Instructions: Patient was advised to call if redness, pain, or fever occur and apply ice for 15 min. out of every hour for the next 12-24 hours as tolerated.  The patient was instructed on the importance of ice and elevation of the extremity to decrease swelling and pain.  Patient is being referred for physical therapy for various modalities.

## 2024-12-28 ENCOUNTER — EMERGENCY (EMERGENCY)
Facility: HOSPITAL | Age: 88
LOS: 1 days | Discharge: HOME | End: 2024-12-28
Attending: STUDENT IN AN ORGANIZED HEALTH CARE EDUCATION/TRAINING PROGRAM | Admitting: STUDENT IN AN ORGANIZED HEALTH CARE EDUCATION/TRAINING PROGRAM
Payer: MEDICARE

## 2024-12-28 VITALS
OXYGEN SATURATION: 95 % | WEIGHT: 197.09 LBS | DIASTOLIC BLOOD PRESSURE: 56 MMHG | TEMPERATURE: 97 F | RESPIRATION RATE: 18 BRPM | SYSTOLIC BLOOD PRESSURE: 122 MMHG | HEART RATE: 98 BPM | HEIGHT: 74 IN

## 2024-12-28 VITALS
OXYGEN SATURATION: 98 % | RESPIRATION RATE: 18 BRPM | DIASTOLIC BLOOD PRESSURE: 62 MMHG | HEART RATE: 87 BPM | SYSTOLIC BLOOD PRESSURE: 120 MMHG

## 2024-12-28 DIAGNOSIS — Z90.49 ACQUIRED ABSENCE OF OTHER SPECIFIED PARTS OF DIGESTIVE TRACT: Chronic | ICD-10-CM

## 2024-12-28 LAB
ALBUMIN SERPL ELPH-MCNC: 3 G/DL — LOW (ref 3.3–5)
ALP SERPL-CCNC: 102 U/L — SIGNIFICANT CHANGE UP (ref 40–120)
ALT FLD-CCNC: 15 U/L — SIGNIFICANT CHANGE UP (ref 10–45)
ANION GAP SERPL CALC-SCNC: 4 MMOL/L — LOW (ref 5–17)
AST SERPL-CCNC: 21 U/L — SIGNIFICANT CHANGE UP (ref 10–40)
BASOPHILS # BLD AUTO: 0.01 K/UL — SIGNIFICANT CHANGE UP (ref 0–0.2)
BASOPHILS NFR BLD AUTO: 0.1 % — SIGNIFICANT CHANGE UP (ref 0–2)
BILIRUB SERPL-MCNC: 1.8 MG/DL — HIGH (ref 0.2–1.2)
BUN SERPL-MCNC: 23 MG/DL — SIGNIFICANT CHANGE UP (ref 7–23)
CALCIUM SERPL-MCNC: 8.6 MG/DL — SIGNIFICANT CHANGE UP (ref 8.4–10.5)
CHLORIDE SERPL-SCNC: 108 MMOL/L — SIGNIFICANT CHANGE UP (ref 96–108)
CO2 SERPL-SCNC: 27 MMOL/L — SIGNIFICANT CHANGE UP (ref 22–31)
CREAT SERPL-MCNC: 1.28 MG/DL — SIGNIFICANT CHANGE UP (ref 0.5–1.3)
EGFR: 52 ML/MIN/1.73M2 — LOW
EOSINOPHIL # BLD AUTO: 0 K/UL — SIGNIFICANT CHANGE UP (ref 0–0.5)
EOSINOPHIL NFR BLD AUTO: 0 % — SIGNIFICANT CHANGE UP (ref 0–6)
GLUCOSE SERPL-MCNC: 94 MG/DL — SIGNIFICANT CHANGE UP (ref 70–99)
HCT VFR BLD CALC: 31.7 % — LOW (ref 39–50)
HGB BLD-MCNC: 10.6 G/DL — LOW (ref 13–17)
IMM GRANULOCYTES NFR BLD AUTO: 0.2 % — SIGNIFICANT CHANGE UP (ref 0–0.9)
LYMPHOCYTES # BLD AUTO: 0.14 K/UL — LOW (ref 1–3.3)
LYMPHOCYTES # BLD AUTO: 1.4 % — LOW (ref 13–44)
MCHC RBC-ENTMCNC: 33.4 G/DL — SIGNIFICANT CHANGE UP (ref 32–36)
MCHC RBC-ENTMCNC: 33.8 PG — SIGNIFICANT CHANGE UP (ref 27–34)
MCV RBC AUTO: 101 FL — HIGH (ref 80–100)
MONOCYTES # BLD AUTO: 0.09 K/UL — SIGNIFICANT CHANGE UP (ref 0–0.9)
MONOCYTES NFR BLD AUTO: 0.9 % — LOW (ref 2–14)
NEUTROPHILS # BLD AUTO: 9.47 K/UL — HIGH (ref 1.8–7.4)
NEUTROPHILS NFR BLD AUTO: 97.4 % — HIGH (ref 43–77)
NRBC # BLD: 0 /100 WBCS — SIGNIFICANT CHANGE UP (ref 0–0)
PLATELET # BLD AUTO: 108 K/UL — LOW (ref 150–400)
POTASSIUM SERPL-MCNC: 4.4 MMOL/L — SIGNIFICANT CHANGE UP (ref 3.5–5.3)
POTASSIUM SERPL-SCNC: 4.4 MMOL/L — SIGNIFICANT CHANGE UP (ref 3.5–5.3)
PROT SERPL-MCNC: 7.3 G/DL — SIGNIFICANT CHANGE UP (ref 6–8.3)
RBC # BLD: 3.14 M/UL — LOW (ref 4.2–5.8)
RBC # FLD: 13.1 % — SIGNIFICANT CHANGE UP (ref 10.3–14.5)
SODIUM SERPL-SCNC: 139 MMOL/L — SIGNIFICANT CHANGE UP (ref 135–145)
WBC # BLD: 9.73 K/UL — SIGNIFICANT CHANGE UP (ref 3.8–10.5)
WBC # FLD AUTO: 9.73 K/UL — SIGNIFICANT CHANGE UP (ref 3.8–10.5)

## 2024-12-28 PROCEDURE — 99285 EMERGENCY DEPT VISIT HI MDM: CPT

## 2024-12-28 PROCEDURE — 36415 COLL VENOUS BLD VENIPUNCTURE: CPT

## 2024-12-28 PROCEDURE — 72131 CT LUMBAR SPINE W/O DYE: CPT | Mod: 26,MC

## 2024-12-28 PROCEDURE — 80053 COMPREHEN METABOLIC PANEL: CPT

## 2024-12-28 PROCEDURE — 72131 CT LUMBAR SPINE W/O DYE: CPT | Mod: MC

## 2024-12-28 PROCEDURE — 99284 EMERGENCY DEPT VISIT MOD MDM: CPT | Mod: 25

## 2024-12-28 PROCEDURE — 96374 THER/PROPH/DIAG INJ IV PUSH: CPT

## 2024-12-28 PROCEDURE — 85025 COMPLETE CBC W/AUTO DIFF WBC: CPT

## 2024-12-28 RX ORDER — LIDOCAINE 40 MG/G
1 CREAM TOPICAL ONCE
Refills: 0 | Status: COMPLETED | OUTPATIENT
Start: 2024-12-28 | End: 2024-12-28

## 2024-12-28 RX ORDER — OXYCODONE HYDROCHLORIDE 30 MG/1
1 TABLET ORAL
Qty: 12 | Refills: 0
Start: 2024-12-28 | End: 2024-12-31

## 2024-12-28 RX ORDER — SODIUM CHLORIDE 9 MG/ML
500 INJECTION, SOLUTION INTRAMUSCULAR; INTRAVENOUS; SUBCUTANEOUS ONCE
Refills: 0 | Status: COMPLETED | OUTPATIENT
Start: 2024-12-28 | End: 2024-12-28

## 2024-12-28 RX ORDER — LIDOCAINE 40 MG/G
1 CREAM TOPICAL
Qty: 15 | Refills: 0
Start: 2024-12-28 | End: 2025-01-01

## 2024-12-28 RX ADMIN — Medication 4 MILLIGRAM(S): at 13:17

## 2024-12-28 RX ADMIN — Medication 4 MILLIGRAM(S): at 14:04

## 2024-12-28 RX ADMIN — LIDOCAINE 1 PATCH: 40 CREAM TOPICAL at 13:17

## 2024-12-28 RX ADMIN — SODIUM CHLORIDE 1000 MILLILITER(S): 9 INJECTION, SOLUTION INTRAMUSCULAR; INTRAVENOUS; SUBCUTANEOUS at 13:17

## 2024-12-28 NOTE — ED PROVIDER NOTE - CLINICAL SUMMARY MEDICAL DECISION MAKING FREE TEXT BOX
89-year-old male with history of hypertension neuropathy A-fib presenting to the ED with complaints of lumbar back pain, patient reports he was sleeping and twisted when pain started, feels like he pulled a muscle, patient denies any reported bowel bladder incontinence denies any saddle esthesia, reports acute pain and inability to walk secondary to pain.  Patient does not use assistive device with ambulation.  CT with no noted acute fracture of lumbar spine, noted stenosis as per report, discussed findings with patient, will provide orthopedic follow-up, patient's pain is well-controlled at this time, patient is ambulating with walker, will be discharged with walker, recommended lidocaine patches and Tylenol every 6 hours for pain patient verbalized understanding agreeable discharge plan.

## 2024-12-28 NOTE — ED PROVIDER NOTE - PATIENT PORTAL LINK FT
You can access the FollowMyHealth Patient Portal offered by [facility] by registering at the following website: [email or web address]. By joining [employer]’s FollowMyHealth portal, you will also be able to view your health information using other applications (apps) compatible with our system.

## 2024-12-28 NOTE — ED ADULT NURSE NOTE - OBJECTIVE STATEMENT
Patient axo3, brought in by EMS from home with complaint of lower back pain since 2 am this morning. Denies any trips or falls. safety maintained.

## 2024-12-28 NOTE — ED PROVIDER NOTE - PHYSICAL EXAMINATION
VITAL SIGNS: I have reviewed nursing notes and confirm.  CONSTITUTIONAL: well-appearing, non-toxic, NAD  SKIN: Warm dry, normal skin turgor  HEAD: NCAT  CARD: RRR, no murmurs, rubs or gallops  RESP: clear to ausculation b/l.  No rales, rhonchi, or wheezing.  ABD: soft, + BS, non-tender, non-distended, no rebound or guarding. No CVA tenderness  EXT: Full ROM, no bony tenderness  NEURO: normal motor. normal sensory. Normal gait.  PSYCH: Cooperative, appropriate.

## 2024-12-28 NOTE — ED ADULT TRIAGE NOTE - CHIEF COMPLAINT QUOTE
Patient brought in by EMS from home with complaint of lower back pain since 2 am this morning. Denies any trips or falls.

## 2024-12-31 NOTE — CHART NOTE - NSCHARTNOTEFT_GEN_A_CORE
89 y o male presented to the ED on 12/28 complaining of back pain as per chart.  ED schedule coordinator assisted with scheduling the recommended orthopedic follow up appointment with Dr. Reyes Gonsalez on 01/02/25.

## 2025-01-02 ENCOUNTER — APPOINTMENT (OUTPATIENT)
Dept: ORTHOPEDIC SURGERY | Facility: CLINIC | Age: 89
End: 2025-01-02

## 2025-09-11 ENCOUNTER — RESULT REVIEW (OUTPATIENT)
Age: 89
End: 2025-09-11

## 2025-09-11 ENCOUNTER — TRANSCRIPTION ENCOUNTER (OUTPATIENT)
Age: 89
End: 2025-09-11

## 2025-09-18 ENCOUNTER — RESULT REVIEW (OUTPATIENT)
Age: 89
End: 2025-09-18

## 2025-09-20 ENCOUNTER — TRANSCRIPTION ENCOUNTER (OUTPATIENT)
Age: 89
End: 2025-09-20

## 2025-09-25 PROBLEM — C18.9 MALIGNANT NEOPLASM OF COLON, UNSPECIFIED PART OF COLON: Status: ACTIVE | Noted: 2025-09-25
